# Patient Record
Sex: MALE | Race: WHITE | NOT HISPANIC OR LATINO | Employment: FULL TIME | URBAN - METROPOLITAN AREA
[De-identification: names, ages, dates, MRNs, and addresses within clinical notes are randomized per-mention and may not be internally consistent; named-entity substitution may affect disease eponyms.]

---

## 2019-06-19 ENCOUNTER — OFFICE VISIT CONVERTED (OUTPATIENT)
Dept: UROLOGY | Facility: CLINIC | Age: 49
End: 2019-06-19
Attending: UROLOGY

## 2019-06-20 ENCOUNTER — HOSPITAL ENCOUNTER (OUTPATIENT)
Dept: PERIOP | Facility: HOSPITAL | Age: 49
Setting detail: HOSPITAL OUTPATIENT SURGERY
Discharge: HOME | End: 2019-06-20
Attending: UROLOGY

## 2019-07-12 LAB
COLOR STONE: NORMAL
COMPN STONE: NORMAL
CONV CA OXALATE DIHYDRATE: 25 %
CONV CA OXALATE MONOHYDRATE: 50 %
CONV CALCIUM PHOSPHATE: 25 %
CONV CALCULI COMMENT: NORMAL
CONV CALCULI DISCLAIMER: NORMAL
CONV CALCULI NOTE: NORMAL
NIDUS STONE QL: NORMAL
SIZE STONE: NORMAL MM
WT STONE: 4.9 MG

## 2019-11-16 ENCOUNTER — HOSPITAL ENCOUNTER (OUTPATIENT)
Dept: URGENT CARE | Facility: CLINIC | Age: 49
Discharge: HOME | End: 2019-11-16
Attending: NURSE PRACTITIONER

## 2019-12-19 ENCOUNTER — OFFICE VISIT CONVERTED (OUTPATIENT)
Dept: FAMILY MEDICINE CLINIC | Facility: CLINIC | Age: 49
End: 2019-12-19
Attending: FAMILY MEDICINE

## 2019-12-19 ENCOUNTER — HOSPITAL ENCOUNTER (OUTPATIENT)
Dept: FAMILY MEDICINE CLINIC | Facility: CLINIC | Age: 49
Discharge: HOME | End: 2019-12-19
Attending: FAMILY MEDICINE

## 2019-12-19 LAB
ALBUMIN SERPL-MCNC: 4.3 G/DL (ref 3.5–5)
ALBUMIN/GLOB SERPL: 1.3 {RATIO} (ref 1.4–2.6)
ALP SERPL-CCNC: 126 U/L (ref 53–128)
ALT SERPL-CCNC: 35 U/L (ref 10–40)
ANION GAP SERPL CALC-SCNC: 23 MMOL/L (ref 8–19)
AST SERPL-CCNC: 22 U/L (ref 15–50)
BILIRUB SERPL-MCNC: 0.19 MG/DL (ref 0.2–1.3)
BUN SERPL-MCNC: 11 MG/DL (ref 5–25)
BUN/CREAT SERPL: 10 {RATIO} (ref 6–20)
CALCIUM SERPL-MCNC: 10 MG/DL (ref 8.7–10.4)
CHLORIDE SERPL-SCNC: 99 MMOL/L (ref 99–111)
CHOLEST SERPL-MCNC: 226 MG/DL (ref 107–200)
CHOLEST/HDLC SERPL: 8.4 {RATIO} (ref 3–6)
CONV CO2: 25 MMOL/L (ref 22–32)
CONV TOTAL PROTEIN: 7.7 G/DL (ref 6.3–8.2)
CREAT UR-MCNC: 1.08 MG/DL (ref 0.7–1.2)
GFR SERPLBLD BASED ON 1.73 SQ M-ARVRAT: >60 ML/MIN/{1.73_M2}
GLOBULIN UR ELPH-MCNC: 3.4 G/DL (ref 2–3.5)
GLUCOSE SERPL-MCNC: 100 MG/DL (ref 70–99)
HDLC SERPL-MCNC: 27 MG/DL (ref 40–60)
LDLC SERPL CALC-MCNC: 126 MG/DL (ref 70–100)
OSMOLALITY SERPL CALC.SUM OF ELEC: 293 MOSM/KG (ref 273–304)
POTASSIUM SERPL-SCNC: 4.6 MMOL/L (ref 3.5–5.3)
PSA SERPL-MCNC: 0.67 NG/ML (ref 0–4)
SODIUM SERPL-SCNC: 142 MMOL/L (ref 135–147)
TRIGL SERPL-MCNC: 1070 MG/DL (ref 40–150)

## 2020-02-10 ENCOUNTER — OFFICE VISIT CONVERTED (OUTPATIENT)
Dept: FAMILY MEDICINE CLINIC | Facility: CLINIC | Age: 50
End: 2020-02-10
Attending: FAMILY MEDICINE

## 2020-02-19 ENCOUNTER — CONVERSION ENCOUNTER (OUTPATIENT)
Dept: GASTROENTEROLOGY | Facility: CLINIC | Age: 50
End: 2020-02-19
Attending: INTERNAL MEDICINE

## 2020-11-25 ENCOUNTER — HOSPITAL ENCOUNTER (OUTPATIENT)
Dept: URGENT CARE | Facility: CLINIC | Age: 50
Discharge: HOME | End: 2020-11-25

## 2020-11-28 LAB — SARS-COV-2 RNA SPEC QL NAA+PROBE: NOT DETECTED

## 2021-05-15 VITALS — RESPIRATION RATE: 16 BRPM | BODY MASS INDEX: 36.29 KG/M2 | HEIGHT: 69 IN | WEIGHT: 245 LBS

## 2021-05-15 VITALS
HEART RATE: 117 BPM | DIASTOLIC BLOOD PRESSURE: 94 MMHG | OXYGEN SATURATION: 96 % | HEIGHT: 70 IN | WEIGHT: 244.25 LBS | SYSTOLIC BLOOD PRESSURE: 145 MMHG | BODY MASS INDEX: 34.97 KG/M2

## 2021-05-15 VITALS
SYSTOLIC BLOOD PRESSURE: 134 MMHG | WEIGHT: 246.37 LBS | HEIGHT: 70 IN | DIASTOLIC BLOOD PRESSURE: 82 MMHG | BODY MASS INDEX: 35.27 KG/M2 | OXYGEN SATURATION: 98 % | HEART RATE: 108 BPM

## 2021-09-04 ENCOUNTER — ANESTHESIA EVENT (OUTPATIENT)
Dept: PERIOP | Facility: HOSPITAL | Age: 51
End: 2021-09-04

## 2021-09-04 ENCOUNTER — APPOINTMENT (OUTPATIENT)
Dept: CT IMAGING | Facility: HOSPITAL | Age: 51
End: 2021-09-04

## 2021-09-04 ENCOUNTER — ANESTHESIA (OUTPATIENT)
Dept: PERIOP | Facility: HOSPITAL | Age: 51
End: 2021-09-04

## 2021-09-04 ENCOUNTER — HOSPITAL ENCOUNTER (OUTPATIENT)
Facility: HOSPITAL | Age: 51
Discharge: HOME | End: 2021-09-04
Attending: EMERGENCY MEDICINE | Admitting: FAMILY MEDICINE

## 2021-09-04 ENCOUNTER — READMISSION MANAGEMENT (OUTPATIENT)
Dept: CALL CENTER | Facility: HOSPITAL | Age: 51
End: 2021-09-04

## 2021-09-04 ENCOUNTER — APPOINTMENT (OUTPATIENT)
Dept: GENERAL RADIOLOGY | Facility: HOSPITAL | Age: 51
End: 2021-09-04

## 2021-09-04 VITALS
OXYGEN SATURATION: 95 % | BODY MASS INDEX: 38.26 KG/M2 | DIASTOLIC BLOOD PRESSURE: 94 MMHG | SYSTOLIC BLOOD PRESSURE: 154 MMHG | TEMPERATURE: 98.6 F | HEART RATE: 105 BPM | HEIGHT: 68 IN | RESPIRATION RATE: 16 BRPM | WEIGHT: 252.43 LBS

## 2021-09-04 DIAGNOSIS — N20.0 BILATERAL KIDNEY STONES: Primary | ICD-10-CM

## 2021-09-04 DIAGNOSIS — N20.1 URETERAL CALCULUS: ICD-10-CM

## 2021-09-04 LAB
ALBUMIN SERPL-MCNC: 4.5 G/DL (ref 3.5–5.2)
ALBUMIN/GLOB SERPL: 1.6 G/DL
ALP SERPL-CCNC: 94 U/L (ref 39–117)
ALT SERPL W P-5'-P-CCNC: 29 U/L (ref 1–41)
ANION GAP SERPL CALCULATED.3IONS-SCNC: 11.9 MMOL/L (ref 5–15)
AST SERPL-CCNC: 18 U/L (ref 1–40)
BACTERIA UR QL AUTO: ABNORMAL /HPF
BASOPHILS # BLD AUTO: 0.05 10*3/MM3 (ref 0–0.2)
BASOPHILS NFR BLD AUTO: 0.6 % (ref 0–1.5)
BILIRUB SERPL-MCNC: 0.2 MG/DL (ref 0–1.2)
BILIRUB UR QL STRIP: NEGATIVE
BUN SERPL-MCNC: 11 MG/DL (ref 6–20)
BUN/CREAT SERPL: 9.6 (ref 7–25)
CALCIUM SPEC-SCNC: 9.4 MG/DL (ref 8.6–10.5)
CHLORIDE SERPL-SCNC: 102 MMOL/L (ref 98–107)
CLARITY UR: ABNORMAL
CO2 SERPL-SCNC: 25.1 MMOL/L (ref 22–29)
COLOR UR: YELLOW
CREAT SERPL-MCNC: 1.14 MG/DL (ref 0.76–1.27)
DEPRECATED RDW RBC AUTO: 39.2 FL (ref 37–54)
EOSINOPHIL # BLD AUTO: 0.46 10*3/MM3 (ref 0–0.4)
EOSINOPHIL NFR BLD AUTO: 5.3 % (ref 0.3–6.2)
ERYTHROCYTE [DISTWIDTH] IN BLOOD BY AUTOMATED COUNT: 12.3 % (ref 12.3–15.4)
GFR SERPL CREATININE-BSD FRML MDRD: 68 ML/MIN/1.73
GLOBULIN UR ELPH-MCNC: 2.8 GM/DL
GLUCOSE SERPL-MCNC: 110 MG/DL (ref 65–99)
GLUCOSE UR STRIP-MCNC: NEGATIVE MG/DL
HCT VFR BLD AUTO: 45.7 % (ref 37.5–51)
HGB BLD-MCNC: 15 G/DL (ref 13–17.7)
HGB UR QL STRIP.AUTO: ABNORMAL
HOLD SPECIMEN: NORMAL
HOLD SPECIMEN: NORMAL
HYALINE CASTS UR QL AUTO: ABNORMAL /LPF
IMM GRANULOCYTES # BLD AUTO: 0.04 10*3/MM3 (ref 0–0.05)
IMM GRANULOCYTES NFR BLD AUTO: 0.5 % (ref 0–0.5)
KETONES UR QL STRIP: NEGATIVE
LEUKOCYTE ESTERASE UR QL STRIP.AUTO: ABNORMAL
LIPASE SERPL-CCNC: 44 U/L (ref 13–60)
LYMPHOCYTES # BLD AUTO: 4.41 10*3/MM3 (ref 0.7–3.1)
LYMPHOCYTES NFR BLD AUTO: 50.3 % (ref 19.6–45.3)
MCH RBC QN AUTO: 28.4 PG (ref 26.6–33)
MCHC RBC AUTO-ENTMCNC: 32.8 G/DL (ref 31.5–35.7)
MCV RBC AUTO: 86.4 FL (ref 79–97)
MONOCYTES # BLD AUTO: 0.55 10*3/MM3 (ref 0.1–0.9)
MONOCYTES NFR BLD AUTO: 6.3 % (ref 5–12)
NEUTROPHILS NFR BLD AUTO: 3.25 10*3/MM3 (ref 1.7–7)
NEUTROPHILS NFR BLD AUTO: 37 % (ref 42.7–76)
NITRITE UR QL STRIP: NEGATIVE
NRBC BLD AUTO-RTO: 0 /100 WBC (ref 0–0.2)
PH UR STRIP.AUTO: 6 [PH] (ref 5–8)
PLATELET # BLD AUTO: 257 10*3/MM3 (ref 140–450)
PMV BLD AUTO: 9.9 FL (ref 6–12)
POTASSIUM SERPL-SCNC: 3.9 MMOL/L (ref 3.5–5.2)
PROT SERPL-MCNC: 7.3 G/DL (ref 6–8.5)
PROT UR QL STRIP: NEGATIVE
RBC # BLD AUTO: 5.29 10*6/MM3 (ref 4.14–5.8)
RBC # UR: ABNORMAL /HPF
REF LAB TEST METHOD: ABNORMAL
SARS-COV-2 RNA RESP QL NAA+PROBE: NOT DETECTED
SODIUM SERPL-SCNC: 139 MMOL/L (ref 136–145)
SP GR UR STRIP: 1.01 (ref 1–1.03)
SQUAMOUS #/AREA URNS HPF: ABNORMAL /HPF
UROBILINOGEN UR QL STRIP: ABNORMAL
WBC # BLD AUTO: 8.76 10*3/MM3 (ref 3.4–10.8)
WBC UR QL AUTO: ABNORMAL /HPF
WHOLE BLOOD HOLD SPECIMEN: NORMAL
WHOLE BLOOD HOLD SPECIMEN: NORMAL

## 2021-09-04 PROCEDURE — 25010000002 HYDROMORPHONE 1 MG/ML SOLUTION: Performed by: FAMILY MEDICINE

## 2021-09-04 PROCEDURE — C1769 GUIDE WIRE: HCPCS | Performed by: UROLOGY

## 2021-09-04 PROCEDURE — 99203 OFFICE O/P NEW LOW 30 MIN: CPT | Performed by: UROLOGY

## 2021-09-04 PROCEDURE — 25010000002 HYDROMORPHONE 1 MG/ML SOLUTION: Performed by: EMERGENCY MEDICINE

## 2021-09-04 PROCEDURE — C1758 CATHETER, URETERAL: HCPCS | Performed by: UROLOGY

## 2021-09-04 PROCEDURE — U0003 INFECTIOUS AGENT DETECTION BY NUCLEIC ACID (DNA OR RNA); SEVERE ACUTE RESPIRATORY SYNDROME CORONAVIRUS 2 (SARS-COV-2) (CORONAVIRUS DISEASE [COVID-19]), AMPLIFIED PROBE TECHNIQUE, MAKING USE OF HIGH THROUGHPUT TECHNOLOGIES AS DESCRIBED BY CMS-2020-01-R: HCPCS | Performed by: UROLOGY

## 2021-09-04 PROCEDURE — 52352 CYSTOURETERO W/STONE REMOVE: CPT | Performed by: UROLOGY

## 2021-09-04 PROCEDURE — 96376 TX/PRO/DX INJ SAME DRUG ADON: CPT

## 2021-09-04 PROCEDURE — G0378 HOSPITAL OBSERVATION PER HR: HCPCS

## 2021-09-04 PROCEDURE — 25010000002 ONDANSETRON PER 1 MG: Performed by: NURSE PRACTITIONER

## 2021-09-04 PROCEDURE — 25010000002 ONDANSETRON PER 1 MG: Performed by: FAMILY MEDICINE

## 2021-09-04 PROCEDURE — 52353 CYSTOURETERO W/LITHOTRIPSY: CPT | Performed by: UROLOGY

## 2021-09-04 PROCEDURE — 99235 HOSP IP/OBS SAME DATE MOD 70: CPT | Performed by: FAMILY MEDICINE

## 2021-09-04 PROCEDURE — 52332 CYSTOSCOPY AND TREATMENT: CPT | Performed by: UROLOGY

## 2021-09-04 PROCEDURE — 87086 URINE CULTURE/COLONY COUNT: CPT | Performed by: EMERGENCY MEDICINE

## 2021-09-04 PROCEDURE — 25010000002 MIDAZOLAM PER 1MG: Performed by: NURSE ANESTHETIST, CERTIFIED REGISTERED

## 2021-09-04 PROCEDURE — 83690 ASSAY OF LIPASE: CPT | Performed by: EMERGENCY MEDICINE

## 2021-09-04 PROCEDURE — C2617 STENT, NON-COR, TEM W/O DEL: HCPCS | Performed by: UROLOGY

## 2021-09-04 PROCEDURE — 25010000002 PROPOFOL 10 MG/ML EMULSION: Performed by: NURSE ANESTHETIST, CERTIFIED REGISTERED

## 2021-09-04 PROCEDURE — C1894 INTRO/SHEATH, NON-LASER: HCPCS | Performed by: UROLOGY

## 2021-09-04 PROCEDURE — 85025 COMPLETE CBC W/AUTO DIFF WBC: CPT | Performed by: EMERGENCY MEDICINE

## 2021-09-04 PROCEDURE — 96374 THER/PROPH/DIAG INJ IV PUSH: CPT

## 2021-09-04 PROCEDURE — 74420 UROGRAPHY RTRGR +-KUB: CPT

## 2021-09-04 PROCEDURE — 25010000002 DEXAMETHASONE PER 1 MG: Performed by: NURSE ANESTHETIST, CERTIFIED REGISTERED

## 2021-09-04 PROCEDURE — 80053 COMPREHEN METABOLIC PANEL: CPT | Performed by: EMERGENCY MEDICINE

## 2021-09-04 PROCEDURE — 25010000002 FENTANYL CITRATE (PF) 50 MCG/ML SOLUTION: Performed by: NURSE ANESTHETIST, CERTIFIED REGISTERED

## 2021-09-04 PROCEDURE — 96375 TX/PRO/DX INJ NEW DRUG ADDON: CPT

## 2021-09-04 PROCEDURE — 81001 URINALYSIS AUTO W/SCOPE: CPT | Performed by: EMERGENCY MEDICINE

## 2021-09-04 PROCEDURE — 0 IOPAMIDOL PER 1 ML: Performed by: UROLOGY

## 2021-09-04 PROCEDURE — 74176 CT ABD & PELVIS W/O CONTRAST: CPT

## 2021-09-04 PROCEDURE — 99284 EMERGENCY DEPT VISIT MOD MDM: CPT

## 2021-09-04 PROCEDURE — 25010000002 KETOROLAC TROMETHAMINE PER 15 MG: Performed by: NURSE PRACTITIONER

## 2021-09-04 PROCEDURE — 25010000002 ONDANSETRON PER 1 MG: Performed by: NURSE ANESTHETIST, CERTIFIED REGISTERED

## 2021-09-04 PROCEDURE — C9803 HOPD COVID-19 SPEC COLLECT: HCPCS | Performed by: UROLOGY

## 2021-09-04 DEVICE — STNT URETRL CLASSC DBL PIG 6F 24CM: Type: IMPLANTABLE DEVICE | Site: URETER | Status: FUNCTIONAL

## 2021-09-04 RX ORDER — DEXAMETHASONE SODIUM PHOSPHATE 4 MG/ML
INJECTION, SOLUTION INTRA-ARTICULAR; INTRALESIONAL; INTRAMUSCULAR; INTRAVENOUS; SOFT TISSUE AS NEEDED
Status: DISCONTINUED | OUTPATIENT
Start: 2021-09-04 | End: 2021-09-04 | Stop reason: SURG

## 2021-09-04 RX ORDER — SODIUM CHLORIDE 0.9 % (FLUSH) 0.9 %
10 SYRINGE (ML) INJECTION EVERY 12 HOURS SCHEDULED
Status: DISCONTINUED | OUTPATIENT
Start: 2021-09-04 | End: 2021-09-04 | Stop reason: HOSPADM

## 2021-09-04 RX ORDER — MIDAZOLAM HYDROCHLORIDE 2 MG/2ML
2 INJECTION, SOLUTION INTRAMUSCULAR; INTRAVENOUS ONCE
Status: DISCONTINUED | OUTPATIENT
Start: 2021-09-04 | End: 2021-09-04 | Stop reason: HOSPADM

## 2021-09-04 RX ORDER — ONDANSETRON 2 MG/ML
4 INJECTION INTRAMUSCULAR; INTRAVENOUS EVERY 6 HOURS PRN
Status: DISCONTINUED | OUTPATIENT
Start: 2021-09-04 | End: 2021-09-04 | Stop reason: HOSPADM

## 2021-09-04 RX ORDER — SODIUM CHLORIDE 0.9 % (FLUSH) 0.9 %
10 SYRINGE (ML) INJECTION AS NEEDED
Status: DISCONTINUED | OUTPATIENT
Start: 2021-09-04 | End: 2021-09-04 | Stop reason: HOSPADM

## 2021-09-04 RX ORDER — ONDANSETRON 4 MG/1
4 TABLET, FILM COATED ORAL EVERY 6 HOURS PRN
Status: DISCONTINUED | OUTPATIENT
Start: 2021-09-04 | End: 2021-09-04 | Stop reason: HOSPADM

## 2021-09-04 RX ORDER — LEVOFLOXACIN 500 MG/1
500 TABLET, FILM COATED ORAL ONCE
Status: COMPLETED | OUTPATIENT
Start: 2021-09-04 | End: 2021-09-04

## 2021-09-04 RX ORDER — SODIUM CHLORIDE, SODIUM LACTATE, POTASSIUM CHLORIDE, CALCIUM CHLORIDE 600; 310; 30; 20 MG/100ML; MG/100ML; MG/100ML; MG/100ML
9 INJECTION, SOLUTION INTRAVENOUS CONTINUOUS PRN
Status: DISCONTINUED | OUTPATIENT
Start: 2021-09-04 | End: 2021-09-04 | Stop reason: HOSPADM

## 2021-09-04 RX ORDER — SUCCINYLCHOLINE/SOD CL,ISO/PF 100 MG/5ML
SYRINGE (ML) INTRAVENOUS AS NEEDED
Status: DISCONTINUED | OUTPATIENT
Start: 2021-09-04 | End: 2021-09-04 | Stop reason: SURG

## 2021-09-04 RX ORDER — KETOROLAC TROMETHAMINE 30 MG/ML
30 INJECTION, SOLUTION INTRAMUSCULAR; INTRAVENOUS ONCE
Status: COMPLETED | OUTPATIENT
Start: 2021-09-04 | End: 2021-09-04

## 2021-09-04 RX ORDER — PROMETHAZINE HYDROCHLORIDE 12.5 MG/1
25 TABLET ORAL ONCE AS NEEDED
Status: DISCONTINUED | OUTPATIENT
Start: 2021-09-04 | End: 2021-09-04 | Stop reason: HOSPADM

## 2021-09-04 RX ORDER — OXYCODONE HYDROCHLORIDE 5 MG/1
5 TABLET ORAL
Status: DISCONTINUED | OUTPATIENT
Start: 2021-09-04 | End: 2021-09-04 | Stop reason: HOSPADM

## 2021-09-04 RX ORDER — SODIUM CHLORIDE, SODIUM LACTATE, POTASSIUM CHLORIDE, CALCIUM CHLORIDE 600; 310; 30; 20 MG/100ML; MG/100ML; MG/100ML; MG/100ML
INJECTION, SOLUTION INTRAVENOUS CONTINUOUS PRN
Status: DISCONTINUED | OUTPATIENT
Start: 2021-09-04 | End: 2021-09-04 | Stop reason: SURG

## 2021-09-04 RX ORDER — MAGNESIUM HYDROXIDE 1200 MG/15ML
LIQUID ORAL AS NEEDED
Status: DISCONTINUED | OUTPATIENT
Start: 2021-09-04 | End: 2021-09-04 | Stop reason: HOSPADM

## 2021-09-04 RX ORDER — LIDOCAINE HYDROCHLORIDE 20 MG/ML
INJECTION, SOLUTION INFILTRATION; PERINEURAL AS NEEDED
Status: DISCONTINUED | OUTPATIENT
Start: 2021-09-04 | End: 2021-09-04 | Stop reason: SURG

## 2021-09-04 RX ORDER — MEPERIDINE HYDROCHLORIDE 25 MG/ML
12.5 INJECTION INTRAMUSCULAR; INTRAVENOUS; SUBCUTANEOUS
Status: DISCONTINUED | OUTPATIENT
Start: 2021-09-04 | End: 2021-09-04 | Stop reason: HOSPADM

## 2021-09-04 RX ORDER — ONDANSETRON 2 MG/ML
4 INJECTION INTRAMUSCULAR; INTRAVENOUS ONCE
Status: COMPLETED | OUTPATIENT
Start: 2021-09-04 | End: 2021-09-04

## 2021-09-04 RX ORDER — ONDANSETRON 2 MG/ML
4 INJECTION INTRAMUSCULAR; INTRAVENOUS ONCE AS NEEDED
Status: DISCONTINUED | OUTPATIENT
Start: 2021-09-04 | End: 2021-09-04 | Stop reason: HOSPADM

## 2021-09-04 RX ORDER — MIDAZOLAM HYDROCHLORIDE 2 MG/2ML
INJECTION, SOLUTION INTRAMUSCULAR; INTRAVENOUS AS NEEDED
Status: DISCONTINUED | OUTPATIENT
Start: 2021-09-04 | End: 2021-09-04 | Stop reason: SURG

## 2021-09-04 RX ORDER — FENTANYL CITRATE 50 UG/ML
INJECTION, SOLUTION INTRAMUSCULAR; INTRAVENOUS AS NEEDED
Status: DISCONTINUED | OUTPATIENT
Start: 2021-09-04 | End: 2021-09-04 | Stop reason: SURG

## 2021-09-04 RX ORDER — ONDANSETRON 2 MG/ML
INJECTION INTRAMUSCULAR; INTRAVENOUS AS NEEDED
Status: DISCONTINUED | OUTPATIENT
Start: 2021-09-04 | End: 2021-09-04 | Stop reason: SURG

## 2021-09-04 RX ORDER — ROCURONIUM BROMIDE 10 MG/ML
INJECTION, SOLUTION INTRAVENOUS AS NEEDED
Status: DISCONTINUED | OUTPATIENT
Start: 2021-09-04 | End: 2021-09-04 | Stop reason: SURG

## 2021-09-04 RX ORDER — DEXTROSE AND SODIUM CHLORIDE 5; .45 G/100ML; G/100ML
75 INJECTION, SOLUTION INTRAVENOUS CONTINUOUS
Status: DISCONTINUED | OUTPATIENT
Start: 2021-09-04 | End: 2021-09-04 | Stop reason: HOSPADM

## 2021-09-04 RX ORDER — PROPOFOL 10 MG/ML
VIAL (ML) INTRAVENOUS AS NEEDED
Status: DISCONTINUED | OUTPATIENT
Start: 2021-09-04 | End: 2021-09-04 | Stop reason: SURG

## 2021-09-04 RX ORDER — KETOROLAC TROMETHAMINE 15 MG/ML
15 INJECTION, SOLUTION INTRAMUSCULAR; INTRAVENOUS EVERY 6 HOURS PRN
Status: DISCONTINUED | OUTPATIENT
Start: 2021-09-04 | End: 2021-09-04 | Stop reason: HOSPADM

## 2021-09-04 RX ORDER — NAPROXEN SODIUM 220 MG
220 TABLET ORAL 2 TIMES DAILY WITH MEALS
Qty: 28 TABLET | Refills: 0 | Status: SHIPPED | OUTPATIENT
Start: 2021-09-04 | End: 2021-09-18

## 2021-09-04 RX ORDER — PROMETHAZINE HYDROCHLORIDE 25 MG/1
25 SUPPOSITORY RECTAL ONCE AS NEEDED
Status: DISCONTINUED | OUTPATIENT
Start: 2021-09-04 | End: 2021-09-04 | Stop reason: HOSPADM

## 2021-09-04 RX ORDER — EPHEDRINE SULFATE 50 MG/ML
INJECTION, SOLUTION INTRAVENOUS AS NEEDED
Status: DISCONTINUED | OUTPATIENT
Start: 2021-09-04 | End: 2021-09-04 | Stop reason: SURG

## 2021-09-04 RX ADMIN — DEXAMETHASONE SODIUM PHOSPHATE 4 MG: 4 INJECTION INTRA-ARTICULAR; INTRALESIONAL; INTRAMUSCULAR; INTRAVENOUS; SOFT TISSUE at 12:37

## 2021-09-04 RX ADMIN — EPHEDRINE SULFATE 15 MG: 50 INJECTION INTRAVENOUS at 13:02

## 2021-09-04 RX ADMIN — KETOROLAC TROMETHAMINE 30 MG: 30 INJECTION, SOLUTION INTRAMUSCULAR at 05:33

## 2021-09-04 RX ADMIN — Medication 160 MG: at 12:29

## 2021-09-04 RX ADMIN — HYDROMORPHONE HYDROCHLORIDE 0.5 MG: 1 INJECTION, SOLUTION INTRAMUSCULAR; INTRAVENOUS; SUBCUTANEOUS at 08:43

## 2021-09-04 RX ADMIN — DEXTROSE AND SODIUM CHLORIDE 75 ML/HR: 5; 450 INJECTION, SOLUTION INTRAVENOUS at 09:49

## 2021-09-04 RX ADMIN — ROCURONIUM BROMIDE 30 MG: 10 INJECTION INTRAVENOUS at 13:40

## 2021-09-04 RX ADMIN — SODIUM CHLORIDE, PRESERVATIVE FREE 10 ML: 5 INJECTION INTRAVENOUS at 11:30

## 2021-09-04 RX ADMIN — SODIUM CHLORIDE 500 ML: 9 INJECTION, SOLUTION INTRAVENOUS at 05:36

## 2021-09-04 RX ADMIN — ONDANSETRON 4 MG: 2 INJECTION INTRAMUSCULAR; INTRAVENOUS at 08:46

## 2021-09-04 RX ADMIN — HYDROMORPHONE HYDROCHLORIDE 1 MG: 1 INJECTION, SOLUTION INTRAMUSCULAR; INTRAVENOUS; SUBCUTANEOUS at 06:30

## 2021-09-04 RX ADMIN — ONDANSETRON 4 MG: 2 INJECTION INTRAMUSCULAR; INTRAVENOUS at 12:38

## 2021-09-04 RX ADMIN — LIDOCAINE HYDROCHLORIDE 50 MG: 20 INJECTION, SOLUTION INFILTRATION; PERINEURAL at 12:29

## 2021-09-04 RX ADMIN — FENTANYL CITRATE 100 MCG: 50 INJECTION INTRAMUSCULAR; INTRAVENOUS at 12:29

## 2021-09-04 RX ADMIN — ONDANSETRON 4 MG: 2 INJECTION INTRAMUSCULAR; INTRAVENOUS at 05:32

## 2021-09-04 RX ADMIN — ROCURONIUM BROMIDE 5 MG: 10 INJECTION INTRAVENOUS at 12:29

## 2021-09-04 RX ADMIN — PROPOFOL 200 MG: 10 INJECTION, EMULSION INTRAVENOUS at 12:29

## 2021-09-04 RX ADMIN — LEVOFLOXACIN 500 MG: 500 TABLET, FILM COATED ORAL at 11:29

## 2021-09-04 RX ADMIN — SUGAMMADEX 200 MG: 100 INJECTION, SOLUTION INTRAVENOUS at 13:44

## 2021-09-04 RX ADMIN — MIDAZOLAM HYDROCHLORIDE 2 MG: 1 INJECTION, SOLUTION INTRAMUSCULAR; INTRAVENOUS at 12:27

## 2021-09-04 RX ADMIN — SODIUM CHLORIDE, POTASSIUM CHLORIDE, SODIUM LACTATE AND CALCIUM CHLORIDE: 600; 310; 30; 20 INJECTION, SOLUTION INTRAVENOUS at 12:26

## 2021-09-04 NOTE — ANESTHESIA POSTPROCEDURE EVALUATION
Patient: Paul Almendarez    Procedure Summary     Date: 09/04/21 Room / Location: [facility] OR 07 / [facility] MAIN OR    Anesthesia Start: 1226 Anesthesia Stop: 1356    Procedure: CYSTOSCOPY, BILATERAL URETEROSCOPY  AND  LEFT LASER LITHOTRIPSY WITH STONE REMOVAL ,  RIGHT RETROGRADE AND RIGHT URETERAL  STENT INSERTION (Bilateral ) Diagnosis:       Ureteral calculus      (Ureteral calculus [N20.1])    Surgeons: Andre Majano MD Provider: Jaylan Pruett MD    Anesthesia Type: general ASA Status: 2          Anesthesia Type: general    Vitals  Vitals Value Taken Time   /94 09/04/21 1423   Temp 36.7 °C (98 °F) 09/04/21 1415   Pulse 95 09/04/21 1427   Resp 18 09/04/21 1355   SpO2 97 % 09/04/21 1426   Vitals shown include unvalidated device data.        Post Anesthesia Care and Evaluation    Patient location during evaluation: PACU  Patient participation: complete - patient participated  Level of consciousness: awake and alert  Pain score: 0  Pain management: adequate  Airway patency: patent  Anesthetic complications: No anesthetic complications  PONV Status: none  Cardiovascular status: acceptable  Respiratory status: acceptable  Hydration status: acceptable  No anesthesia care post op

## 2021-09-04 NOTE — ANESTHESIA PREPROCEDURE EVALUATION
Anesthesia Evaluation     Patient summary reviewed and Nursing notes reviewed   no history of anesthetic complications:  NPO Solid Status: > 8 hours  NPO Liquid Status: > 8 hours           Airway   Mallampati: IV  TM distance: >3 FB  Neck ROM: full  Large neck circumference and No difficulty expected  Dental - normal exam     Pulmonary - normal exam   (+) a smoker Former,   Cardiovascular - negative cardio ROS and normal exam  Exercise tolerance: good (4-7 METS)    Rhythm: regular  Rate: normal        Neuro/Psych- negative ROS  GI/Hepatic/Renal/Endo    (+) obesity,   renal disease stones,     ROS Comment: Nausea and vomiting with the kidney stone pain this morning. Plan RSI.    Musculoskeletal (-) negative ROS    Abdominal   (+) obese,     Bowel sounds: normal.   Substance History - negative use     OB/GYN negative ob/gyn ROS         Other - negative ROS                     Anesthesia Plan    ASA 2     general   Rapid sequence  intravenous induction     Anesthetic plan, all risks, benefits, and alternatives have been provided, discussed and informed consent has been obtained with: patient.    Plan discussed with CRNA.

## 2021-09-04 NOTE — OUTREACH NOTE
Prep Survey      Responses   [facility] facility patient discharged from?  [facility]   Is LACE score < 7 ?  Yes   Emergency Room discharge w/ pulse ox?  No   Eligibility  [facility]   Date of Admission  09/04/21   Date of Discharge  09/04/21   Discharge Disposition  Home or Self Care   Discharge diagnosis  Ureteral calculus CYSTOSCOPY, BILATERAL URETEROSCOPY  AND  LEFT LASER LITHOTRIPSY WITH STONE REMOVAL ,  RIGHT RETROGRADE AND RIGHT URETERAL  STENT INSERTION   Does the patient have one of the following disease processes/diagnoses(primary or secondary)?  General Surgery   Does the patient have Home health ordered?  No   Is there a DME ordered?  No   Prep survey completed?  Yes          Betty Roca RN

## 2021-09-05 LAB — BACTERIA SPEC AEROBE CULT: NO GROWTH

## 2021-09-06 ENCOUNTER — TRANSITIONAL CARE MANAGEMENT TELEPHONE ENCOUNTER (OUTPATIENT)
Dept: CALL CENTER | Facility: HOSPITAL | Age: 51
End: 2021-09-06

## 2021-09-06 NOTE — OUTREACH NOTE
Call Center TCM Note      Responses   [facility] patient discharged from?  [facility]   Does the patient have one of the following disease processes/diagnoses(primary or secondary)?  General Surgery   TCM attempt successful?  No [NO verbal release]   Unsuccessful attempts  Attempt 2          Catherine Houston RN    9/6/2021, 16:06 EDT

## 2021-09-07 ENCOUNTER — TRANSITIONAL CARE MANAGEMENT TELEPHONE ENCOUNTER (OUTPATIENT)
Dept: CALL CENTER | Facility: HOSPITAL | Age: 51
End: 2021-09-07

## 2021-09-07 NOTE — OUTREACH NOTE
Call Center TCM Note      Responses   [facility] patient discharged from?  [facility]   Does the patient have one of the following disease processes/diagnoses(primary or secondary)?  General Surgery   TCM attempt successful?  Yes   Call start time  0831   Call end time  [phone]   Discharge diagnosis  Ureteral calculus CYSTOSCOPY, BILATERAL URETEROSCOPY  AND  LEFT LASER LITHOTRIPSY WITH STONE REMOVAL ,  RIGHT RETROGRADE AND RIGHT URETERAL  STENT INSERTION   Meds reviewed with patient/caregiver?  Yes   Is the patient having any side effects they believe may be caused by any medication additions or changes?  No   Does the patient have all medications related to this admission filled (includes all antibiotics, pain medications, etc.)  Yes   Is the patient taking all medications as directed (includes completed medication regime)?  Yes   Medication comments  Aleve prescribed   Does the patient have a follow up appointment scheduled with their surgeon?  No   What is preventing the patient from scheduling follow up appointments?  Waiting on return call [Patient attempting to schedule an appt--will route a message to office and request a call to pt]   Has the patient kept scheduled appointments due by today?  N/A   Comments  Hospital D/C follow-up scheduled for 9/9/21 @1215pm    Has home health visited the patient within 72 hours of discharge?  N/A   Psychosocial issues?  No   Did the patient receive a copy of their discharge instructions?  Yes   Nursing interventions  Reviewed instructions with patient   What is the patient's perception of their health status since discharge?  Same [Pt has a ureteral stent and is urinating w/o feeling of excess pressure, urine is bright pink (drinking plenty of fluids).  Denies fever/chills, n/v. Uncomfortable because of stent and eager for removal of stent/stone. ]   Nursing interventions  Nurse provided patient education   Is the patient /caregiver able to teach back basic post-op care?   No tub bath, swimming, or hot tub until instructed by MD [Patient has returned to work--ensured he is drinking plenty of fluids]   Is the patient/caregiver able to teach back signs and symptoms of incisional infection?  -- [Pt does not have an incision]   Is the patient/caregiver able to teach back the hierarchy of who to call/visit for symptoms/problems? PCP, Specialist, Home health nurse, Urgent Care, ED, 911  Yes   TCM call completed?  Yes          Catherine Houston RN    9/7/2021, 08:46 EDT

## 2021-09-08 NOTE — PROGRESS NOTES
Chief Complaint    Urologic complaint    Subjective          Paul Almendarez presents to [facility] UROLOGY  History of Present Illness    50-year-old gentleman presented on 9/4 to the emergency room to local his urologist with bilateral structuring stone status post surgery    Patient's been doing pretty well since surgery, no fevers or chills.  Pain is about a 1 currently.    Patient has never been on medicine for kidney stone prevention.    9/9/2021 urine culture-negative  9/4/2021 bilateral ureteroscopy, left stone removal, right stent placement - Gretel -no stent placed on the left, no mention of removal of nonobstructing left renal stones in the operative note.  Unable to identify the right stone because of a tortuous ureter, right stent was placed    9/4/2021 CT-abd/pelvis without -left kidney with a 5 mm mid ureteral stone, 3 nonobstructing stones in the left kidney 5 mm, 4 mm and 2 mm.  Right kidney has a 2 mm stone in the kidney nonobstructing and a 1.4 x 1.2 proximal ureteral stone    No cardiopulmonary history.  Takes naproxen    9/21 creatinine 1.1, GFR 68    Previous     6[place]14[place] ureteral stones one is about 4 mm and one is about 7 mm.  Also a 3 mm nonobstructing left renal stone.  7 mm right nonobstructing renal stone.    he has passed through for kidney stones, he said lithotripsy x2    No cardiopulmonary history.  Patient does not smoke.  Patient does not use blood thinner.    Results for orders placed or performed in visit on 09/10/21   POC Urinalysis Dipstick, Automated    Specimen: Urine   Result Value Ref Range    Color Red (A) Yellow, Straw, Dark Yellow, Yadira    Clarity, UA Clear Clear    Specific Gravity  1.025 1.005 - 1.030    pH, Urine 6.5 5.0 - 8.0    Leukocytes Small (1+) (A) Negative    Nitrite, UA Negative Negative    Protein,  mg/dL (A) Negative mg/dL    Glucose, UA Negative Negative, 1000 mg/dL  (3+) mg/dL    Ketones, UA Negative Negative    Urobilinogen, UA Normal Normal    Bilirubin Negative Negative    Blood, UA Large (A) Negative         Past History:  Medical History: has a past medical history of Kidney stone.   Surgical History: has a past surgical history that includes Kidney stone surgery and ureteroscopy laser lithotripsy with stent insertion (Bilateral, 9/4/2021).   Family History: family history is not on file.   Social History: reports that he has never smoked. He does not have any smokeless tobacco history on file. He reports previous alcohol use. He reports that he does not use drugs.  Allergies: Iodine       Current Outpatient Medications:   •  naproxen sodium (Aleve) 220 MG tablet, Take 1 tablet by mouth 2 (Two) Times a Day With Meals for 14 days., Disp: 28 tablet, Rfl: 0     Physical exam       Alert and orient x3  Well appearing, well developed, in no acute distress   Unlabored respirations  Nontender/nondistended  ctab  rrr  Grossly oriented to person, place and time, judgment is intact, normal mood and affect         Objective     Vital Signs:   There were no vitals taken for this visit.             Assessment and Plan    Diagnoses and all orders for this visit:    1. Nephrolithiasis (Primary)      Patient needs to be scheduled for cystoscopy with right ureteroscopy with laser and right ureteral stent exchange.  Risks and benefits were discussed including bleeding, infection and damage to the urinary system.  We also discussed the risk of anesthesia up to and including death.  Patient voiced understanding and would like to proceed.

## 2021-09-09 ENCOUNTER — OFFICE VISIT (OUTPATIENT)
Dept: FAMILY MEDICINE CLINIC | Facility: CLINIC | Age: 51
End: 2021-09-09

## 2021-09-09 VITALS
HEART RATE: 95 BPM | BODY MASS INDEX: 38.28 KG/M2 | OXYGEN SATURATION: 98 % | DIASTOLIC BLOOD PRESSURE: 96 MMHG | SYSTOLIC BLOOD PRESSURE: 166 MMHG | HEIGHT: 68 IN | TEMPERATURE: 97.8 F | WEIGHT: 252.6 LBS

## 2021-09-09 DIAGNOSIS — Z09 HOSPITAL DISCHARGE FOLLOW-UP: ICD-10-CM

## 2021-09-09 DIAGNOSIS — I10 ESSENTIAL HYPERTENSION: ICD-10-CM

## 2021-09-09 DIAGNOSIS — N20.0 KIDNEY STONE: Primary | ICD-10-CM

## 2021-09-09 PROCEDURE — 99496 TRANSJ CARE MGMT HIGH F2F 7D: CPT | Performed by: FAMILY MEDICINE

## 2021-09-09 RX ORDER — IRBESARTAN 150 MG/1
150 TABLET ORAL NIGHTLY
Qty: 90 TABLET | Refills: 0 | Status: SHIPPED | OUTPATIENT
Start: 2021-09-09 | End: 2021-11-05

## 2021-09-09 NOTE — ASSESSMENT & PLAN NOTE
He checks his blood pressure infrequently outside the office.  He states that when he does it it will typically be mildly elevated.  We will go ahead and start medication today and then have him follow-up.

## 2021-09-09 NOTE — ASSESSMENT & PLAN NOTE
Overall he is doing well as expected.  He still has a ureter stent in place that is still causes him some mild symptoms.  He has follow-up with urology, Dr. Azul.

## 2021-09-09 NOTE — ASSESSMENT & PLAN NOTE
He still has a right renal stone.  He has a follow-up with Dr. Azul tomorrow to talk about further management of this.

## 2021-09-09 NOTE — PROGRESS NOTES
Chief Complaint   Patient presents with   • Hospital Follow Up Visit     kidney stone        Subjective     Paul Almendarez  has a past medical history of Kidney stone.    Hospital admission-he was actually admitted 9/4 and discharged 9/4/2021.  He was actually admitted very early in the morning, then had a lithotripsy procedure and then was discharged from there within the same day.  The discharge nurse had called him afterwards see how he was doing and if he needed any further needs care assistance.  He states he still has some dysuria and some hematuria, but still has a right ureter stent.  He still has a stone in the right kidney which urology is planning a future lithotripsy for.      PHQ-2 Depression Screening  Little interest or pleasure in doing things? 0   Feeling down, depressed, or hopeless? 0   PHQ-2 Total Score 0   PHQ-9 Depression Screening  Little interest or pleasure in doing things? 0   Feeling down, depressed, or hopeless? 0   Trouble falling or staying asleep, or sleeping too much?     Feeling tired or having little energy?     Poor appetite or overeating?     Feeling bad about yourself - or that you are a failure or have let yourself or your family down?     Trouble concentrating on things, such as reading the newspaper or watching television?     Moving or speaking so slowly that other people could have noticed? Or the opposite - being so fidgety or restless that you have been moving around a lot more than usual?     Thoughts that you would be better off dead, or of hurting yourself in some way?     PHQ-9 Total Score 0   If you checked off any problems, how difficult have these problems made it for you to do your work, take care of things at home, or get along with other people?       Allergies   Allergen Reactions   • Iodine Hives, Shortness Of Breath and Swelling       Prior to Admission medications    Medication Sig Start Date End Date Taking? Authorizing Provider   naproxen sodium  "(Aleve) 220 MG tablet Take 1 tablet by mouth 2 (Two) Times a Day With Meals for 14 days. 21 Yes Judson Benítez MD        Patient Active Problem List   Diagnosis   • Ureteral stone   • Kidney stone   • Hospital discharge follow-up   • Essential hypertension        Past Surgical History:   Procedure Laterality Date   • KIDNEY STONE SURGERY     • URETEROSCOPY LASER LITHOTRIPSY WITH STENT INSERTION Bilateral 2021    Procedure: CYSTOSCOPY, BILATERAL URETEROSCOPY  AND  LEFT LASER LITHOTRIPSY WITH STONE REMOVAL ,  RIGHT RETROGRADE AND RIGHT URETERAL  STENT INSERTION;  Surgeon: Andre Majano MD;  Location: [facility] MAIN OR;  Service: Urology;  Laterality: Bilateral;       Social History     Socioeconomic History   • Marital status:      Spouse name: Not on file   • Number of children: Not on file   • Years of education: Not on file   • Highest education level: Not on file   Tobacco Use   • Smoking status: Former Smoker     Types: Cigarettes     Quit date:      Years since quittin.6   • Smokeless tobacco: Never Used   Vaping Use   • Vaping Use: Never used   Substance and Sexual Activity   • Alcohol use: Not Currently   • Drug use: Never       History reviewed. No pertinent family history.    Family history, surgical history, past medical history, Allergies and med's reviewed with patient today and updated in [employer] EMR.     ROS:  Review of Systems   Constitutional: Negative for chills, fatigue and fever.   Genitourinary: Positive for dysuria, frequency and hematuria. Negative for difficulty urinating and flank pain.   Musculoskeletal: Negative for back pain.       OBJECTIVE:  Vitals:    21 1147   BP: 166/96   BP Location: Right arm   Patient Position: Sitting   Pulse: 95   Temp: 97.8 °F (36.6 °C)   SpO2: 98%   Weight: 115 kg (252 lb 9.6 oz)   Height: 172.7 cm (68\")     No exam data present   Body mass index is 38.41 kg/m².  No LMP for male patient.    Physical Exam  Vitals and nursing " note reviewed.   Constitutional:       General: He is not in acute distress.     Appearance: Normal appearance. He is normal weight.   HENT:      Head: Normocephalic.      Right Ear: Tympanic membrane, ear canal and external ear normal.      Left Ear: Tympanic membrane, ear canal and external ear normal.      Nose: Nose normal.      Mouth/Throat:      Mouth: Mucous membranes are moist.      Pharynx: Oropharynx is clear.   Eyes:      General: No scleral icterus.     Conjunctiva/sclera: Conjunctivae normal.      Pupils: Pupils are equal, round, and reactive to light.   Cardiovascular:      Rate and Rhythm: Normal rate and regular rhythm.      Pulses: Normal pulses.      Heart sounds: Normal heart sounds. No murmur heard.     Pulmonary:      Effort: Pulmonary effort is normal.      Breath sounds: Normal breath sounds. No wheezing, rhonchi or rales.   Musculoskeletal:      Cervical back: No rigidity or tenderness.   Lymphadenopathy:      Cervical: No cervical adenopathy.   Skin:     General: Skin is warm and dry.      Coloration: Skin is not jaundiced.      Findings: No rash.   Neurological:      General: No focal deficit present.      Mental Status: He is alert and oriented to person, place, and time.      Gait: Gait normal.   Psychiatric:         Mood and Affect: Mood normal.         Thought Content: Thought content normal.         Judgment: Judgment normal.         Procedures    Admission on 09/04/2021, Discharged on 09/04/2021   Component Date Value Ref Range Status   • Glucose 09/04/2021 110* 65 - 99 mg/dL Final   • BUN 09/04/2021 11  6 - 20 mg/dL Final   • Creatinine 09/04/2021 1.14  0.76 - 1.27 mg/dL Final   • Sodium 09/04/2021 139  136 - 145 mmol/L Final   • Potassium 09/04/2021 3.9  3.5 - 5.2 mmol/L Final    Slight hemolysis detected by analyzer. Results may be affected.   • Chloride 09/04/2021 102  98 - 107 mmol/L Final   • CO2 09/04/2021 25.1  22.0 - 29.0 mmol/L Final   • Calcium 09/04/2021 9.4  8.6 - 10.5  mg/dL Final   • Total Protein 09/04/2021 7.3  6.0 - 8.5 g/dL Final   • Albumin 09/04/2021 4.50  3.50 - 5.20 g/dL Final   • ALT (SGPT) 09/04/2021 29  1 - 41 U/L Final   • AST (SGOT) 09/04/2021 18  1 - 40 U/L Final   • Alkaline Phosphatase 09/04/2021 94  39 - 117 U/L Final   • Total Bilirubin 09/04/2021 0.2  0.0 - 1.2 mg/dL Final   • eGFR Non  Amer 09/04/2021 68  >60 mL/min/1.73 Final   • Globulin 09/04/2021 2.8  gm/dL Final   • A/G Ratio 09/04/2021 1.6  g/dL Final   • BUN/Creatinine Ratio 09/04/2021 9.6  7.0 - 25.0 Final   • Anion Gap 09/04/2021 11.9  5.0 - 15.0 mmol/L Final   • Lipase 09/04/2021 44  13 - 60 U/L Final   • Color, UA 09/04/2021 Yellow  Yellow, Straw Final   • Appearance, UA 09/04/2021 Slightly Cloudy* Clear Final   • pH, UA 09/04/2021 6.0  5.0 - 8.0 Final   • Specific Gravity, UA 09/04/2021 1.015  1.005 - 1.030 Final   • Glucose, UA 09/04/2021 Negative  Negative Final   • Ketones, UA 09/04/2021 Negative  Negative Final   • Bilirubin, UA 09/04/2021 Negative  Negative Final   • Blood, UA 09/04/2021 Large (3+)* Negative Final   • Protein, UA 09/04/2021 Negative  Negative Final   • Leuk Esterase, UA 09/04/2021 Small (1+)* Negative Final   • Nitrite, UA 09/04/2021 Negative  Negative Final   • Urobilinogen, UA 09/04/2021 0.2 E.U./dL  0.2 - 1.0 E.U./dL Final   • Extra Tube 09/04/2021 Hold for add-ons.   Final    Auto resulted.   • Extra Tube 09/04/2021 hold for add-on   Final    Auto resulted   • Extra Tube 09/04/2021 Hold for add-ons.   Final    Auto resulted.   • Extra Tube 09/04/2021 hold for add-on   Final    Auto resulted   • WBC 09/04/2021 8.76  3.40 - 10.80 10*3/mm3 Final   • RBC 09/04/2021 5.29  4.14 - 5.80 10*6/mm3 Final   • Hemoglobin 09/04/2021 15.0  13.0 - 17.7 g/dL Final   • Hematocrit 09/04/2021 45.7  37.5 - 51.0 % Final   • MCV 09/04/2021 86.4  79.0 - 97.0 fL Final   • MCH 09/04/2021 28.4  26.6 - 33.0 pg Final   • MCHC 09/04/2021 32.8  31.5 - 35.7 g/dL Final   • RDW 09/04/2021 12.3  12.3  - 15.4 % Final   • RDW-SD 09/04/2021 39.2  37.0 - 54.0 fl Final   • MPV 09/04/2021 9.9  6.0 - 12.0 fL Final   • Platelets 09/04/2021 257  140 - 450 10*3/mm3 Final   • Neutrophil % 09/04/2021 37.0* 42.7 - 76.0 % Final   • Lymphocyte % 09/04/2021 50.3* 19.6 - 45.3 % Final   • Monocyte % 09/04/2021 6.3  5.0 - 12.0 % Final   • Eosinophil % 09/04/2021 5.3  0.3 - 6.2 % Final   • Basophil % 09/04/2021 0.6  0.0 - 1.5 % Final   • Immature Grans % 09/04/2021 0.5  0.0 - 0.5 % Final   • Neutrophils, Absolute 09/04/2021 3.25  1.70 - 7.00 10*3/mm3 Final   • Lymphocytes, Absolute 09/04/2021 4.41* 0.70 - 3.10 10*3/mm3 Final   • Monocytes, Absolute 09/04/2021 0.55  0.10 - 0.90 10*3/mm3 Final   • Eosinophils, Absolute 09/04/2021 0.46* 0.00 - 0.40 10*3/mm3 Final   • Basophils, Absolute 09/04/2021 0.05  0.00 - 0.20 10*3/mm3 Final   • Immature Grans, Absolute 09/04/2021 0.04  0.00 - 0.05 10*3/mm3 Final   • nRBC 09/04/2021 0.0  0.0 - 0.2 /100 WBC Final   • RBC, UA 09/04/2021 Too Numerous to Count* None Seen /HPF Final   • WBC, UA 09/04/2021 21-30* None Seen /HPF Final   • Bacteria, UA 09/04/2021 None Seen  None Seen /HPF Final   • Squamous Epithelial Cells, UA 09/04/2021 0-2  None Seen, 0-2 /HPF Final   • Hyaline Casts, UA 09/04/2021 7-12  None Seen /LPF Final   • Methodology 09/04/2021 Automated Microscopy   Final   • Urine Culture 09/04/2021 No growth   Final   • COVID19 09/04/2021 Not Detected  Not Detected - Ref. Range Final       ASSESSMENT/ PLAN:    Diagnoses and all orders for this visit:    1. Kidney stone (Primary)  Assessment & Plan:  He still has a right renal stone.  He has a follow-up with Dr. Azul tomorrow to talk about further management of this.      2. Essential hypertension  Assessment & Plan:  He checks his blood pressure infrequently outside the office.  He states that when he does it it will typically be mildly elevated.  We will go ahead and start medication today and then have him follow-up.      3. Hospital  discharge follow-up  Assessment & Plan:  Overall he is doing well as expected.  He still has a ureter stent in place that is still causes him some mild symptoms.  He has follow-up with urology, Dr. Azul.      Other orders  -     irbesartan (Avapro) 150 MG tablet; Take 1 tablet by mouth Every Night for 90 days.  Dispense: 90 tablet; Refill: 0      Orders Placed Today:     New Medications Ordered This Visit   Medications   • irbesartan (Avapro) 150 MG tablet     Sig: Take 1 tablet by mouth Every Night for 90 days.     Dispense:  90 tablet     Refill:  0        Management Plan:     An After Visit Summary was printed and given to the patient at discharge.    Follow-up: Return in about 2 months (around 11/9/2021) for Recheck.    Andrade Johnson DO 9/9/2021 12:18 EDT  This note was electronically signed.

## 2021-09-10 ENCOUNTER — PREP FOR SURGERY (OUTPATIENT)
Dept: OTHER | Facility: HOSPITAL | Age: 51
End: 2021-09-10

## 2021-09-10 ENCOUNTER — OFFICE VISIT (OUTPATIENT)
Dept: UROLOGY | Facility: CLINIC | Age: 51
End: 2021-09-10

## 2021-09-10 VITALS — WEIGHT: 248.2 LBS | BODY MASS INDEX: 36.76 KG/M2 | HEIGHT: 69 IN

## 2021-09-10 DIAGNOSIS — N20.0 NEPHROLITHIASIS: Primary | ICD-10-CM

## 2021-09-10 DIAGNOSIS — T83.122A: Primary | ICD-10-CM

## 2021-09-10 DIAGNOSIS — N20.1 URETERAL STONE: ICD-10-CM

## 2021-09-10 LAB
BILIRUB BLD-MCNC: NEGATIVE MG/DL
CLARITY, POC: CLEAR
COLOR UR: ABNORMAL
GLUCOSE UR STRIP-MCNC: NEGATIVE MG/DL
KETONES UR QL: NEGATIVE
LEUKOCYTE EST, POC: ABNORMAL
NITRITE UR-MCNC: NEGATIVE MG/ML
PH UR: 6.5 [PH] (ref 5–8)
PROT UR STRIP-MCNC: ABNORMAL MG/DL
RBC # UR STRIP: ABNORMAL /UL
SP GR UR: 1.02 (ref 1–1.03)
UROBILINOGEN UR QL: NORMAL

## 2021-09-10 PROCEDURE — 99213 OFFICE O/P EST LOW 20 MIN: CPT | Performed by: UROLOGY

## 2021-09-10 PROCEDURE — 81003 URINALYSIS AUTO W/O SCOPE: CPT | Performed by: UROLOGY

## 2021-09-10 PROCEDURE — 87086 URINE CULTURE/COLONY COUNT: CPT | Performed by: UROLOGY

## 2021-09-10 RX ORDER — SODIUM CHLORIDE 0.9 % (FLUSH) 0.9 %
10 SYRINGE (ML) INJECTION AS NEEDED
Status: CANCELLED | OUTPATIENT
Start: 2021-09-10

## 2021-09-10 RX ORDER — LEVOFLOXACIN 5 MG/ML
500 INJECTION, SOLUTION INTRAVENOUS ONCE
Status: CANCELLED | OUTPATIENT
Start: 2021-09-10 | End: 2021-09-10

## 2021-09-10 RX ORDER — SODIUM CHLORIDE 9 MG/ML
100 INJECTION, SOLUTION INTRAVENOUS CONTINUOUS
Status: CANCELLED | OUTPATIENT
Start: 2021-09-10

## 2021-09-10 RX ORDER — SODIUM CHLORIDE 0.9 % (FLUSH) 0.9 %
3 SYRINGE (ML) INJECTION EVERY 12 HOURS SCHEDULED
Status: CANCELLED | OUTPATIENT
Start: 2021-09-10

## 2021-09-10 NOTE — H&P (VIEW-ONLY)
Chief Complaint    Urologic complaint    Subjective          Paul Almendarez presents to [facility] [place] ORDERS ONLY  History of Present Illness    50-year-old gentleman presented on 9/4 to the emergency room to local his urologist with bilateral structuring stone status post surgery    Patient's been doing pretty well since surgery, no fevers or chills.  Pain is about a 1 currently.    Patient has never been on medicine for kidney stone prevention.    9/9/2021 urine culture-negative  9/4/2021 bilateral ureteroscopy, left stone removal, right stent placement - Gretel -no stent placed on the left, no mention of removal of nonobstructing left renal stones in the operative note.  Unable to identify the right stone because of a tortuous ureter, right stent was placed    9/4/2021 CT-abd/pelvis without -left kidney with a 5 mm mid ureteral stone, 3 nonobstructing stones in the left kidney 5 mm, 4 mm and 2 mm.  Right kidney has a 2 mm stone in the kidney nonobstructing and a 1.4 x 1.2 proximal ureteral stone    No cardiopulmonary history.  Takes naproxen    9/21 creatinine 1.1, GFR 68    Previous     6[place]14[place]2019 CT abdomen[place] 2 left ureteral stones one is about 4 mm and one is about 7 mm.  Also a 3 mm nonobstructing left renal stone.  7 mm right nonobstructing renal stone.    he has passed through for kidney stones, he said lithotripsy x2    No cardiopulmonary history.  Patient does not smoke.  Patient does not use blood thinner.    Past History:  Medical History: has a past medical history of Kidney stone.   Surgical History: has a past surgical history that includes Kidney stone surgery and ureteroscopy laser lithotripsy with stent insertion (Bilateral, 9/4/2021).   Family History: family history is not on file.   Social History: reports that he quit smoking about 5 years ago. His smoking use included cigarettes. He has never used smokeless tobacco. He reports previous alcohol use. He  reports that he does not use drugs.  Allergies: Iodine       Current Outpatient Medications:   •  irbesartan (Avapro) 150 MG tablet, Take 1 tablet by mouth Every Night for 90 days., Disp: 90 tablet, Rfl: 0  •  naproxen sodium (Aleve) 220 MG tablet, Take 1 tablet by mouth 2 (Two) Times a Day With Meals for 14 days., Disp: 28 tablet, Rfl: 0     Physical exam       Alert and orient x3  Well appearing, well developed, in no acute distress   Unlabored respirations  Nontender/nondistended  ctab  rrr  Grossly oriented to person, place and time, judgment is intact, normal mood and affect         Objective     Vital Signs:   There were no vitals taken for this visit.             Assessment and Plan    Diagnoses and all orders for this visit:    1. Ureteral stent displacement (CMS/[facility]) (Primary)  -     Case Request; Standing  -     COVID PRE-OP / PRE-PROCEDURE SCREENING ORDER (NO ISOLATION) - Swab, Nasopharynx; Future  -     sodium chloride 0.9 % infusion  -     levoFLOXacin (LEVAQUIN) 500 mg/100 mL D5W (premix) (LEVAQUIN) 500 mg  -     sodium chloride 0.9 % flush 3 mL  -     sodium chloride 0.9 % flush 10 mL  -     Case Request    Other orders  -     Outpatient In A Bed; Standing  -     Follow Anesthesia Guidelines / Protocol; Future  -     Provide NPO Instructions to Patient; Future  -     Follow Anesthesia Guidelines / Protocol; Standing  -     Obtain Informed Consent; Standing  -     Insert Peripheral IV; Standing  -     Saline Lock & Maintain IV Access; Standing      Patient needs to be scheduled for cystoscopy with right ureteroscopy with laser and right ureteral stent exchange.  Risks and benefits were discussed including bleeding, infection and damage to the urinary system.  We also discussed the risk of anesthesia up to and including death.  Patient voiced understanding and would like to proceed.

## 2021-09-10 NOTE — H&P
Chief Complaint    Urologic complaint    Subjective          Paul Almendarez presents to [facility] [place] ORDERS ONLY  History of Present Illness    50-year-old gentleman presented on 9/4 to the emergency room to local his urologist with bilateral structuring stone status post surgery    Patient's been doing pretty well since surgery, no fevers or chills.  Pain is about a 1 currently.    Patient has never been on medicine for kidney stone prevention.    9/9/2021 urine culture-negative  9/4/2021 bilateral ureteroscopy, left stone removal, right stent placement - Gretel -no stent placed on the left, no mention of removal of nonobstructing left renal stones in the operative note.  Unable to identify the right stone because of a tortuous ureter, right stent was placed    9/4/2021 CT-abd/pelvis without -left kidney with a 5 mm mid ureteral stone, 3 nonobstructing stones in the left kidney 5 mm, 4 mm and 2 mm.  Right kidney has a 2 mm stone in the kidney nonobstructing and a 1.4 x 1.2 proximal ureteral stone    No cardiopulmonary history.  Takes naproxen    9/21 creatinine 1.1, GFR 68    Previous     6/14/2019 CT abdomen[place]pelvis withoutL[place] Trailpatient has 2 left ureteral stones one is about 4 mm and one is about 7 mm.  Also a 3 mm nonobstructing left renal stone.  7 mm right nonobstructing renal stone.    he has passed through for kidney stones, he said lithotripsy x2    No cardiopulmonary history.  Patient does not smoke.  Patient does not use blood thinner.    Past History:  Medical History: has a past medical history of Kidney stone.   Surgical History: has a past surgical history that includes Kidney stone surgery and ureteroscopy laser lithotripsy with stent insertion (Bilateral, 9/4/2021).   Family History: family history is not on file.   Social History: reports that he quit smoking about 5 years ago. His smoking use included cigarettes. He has never used smokeless tobacco. He reports previous alcohol use. He  reports that he does not use drugs.  Allergies: Iodine       Current Outpatient Medications:   •  irbesartan (Avapro) 150 MG tablet, Take 1 tablet by mouth Every Night for 90 days., Disp: 90 tablet, Rfl: 0  •  naproxen sodium (Aleve) 220 MG tablet, Take 1 tablet by mouth 2 (Two) Times a Day With Meals for 14 days., Disp: 28 tablet, Rfl: 0     Physical exam       Alert and orient x3  Well appearing, well developed, in no acute distress   Unlabored respirations  Nontender/nondistended  ctab  rrr  Grossly oriented to person, place and time, judgment is intact, normal mood and affect         Objective     Vital Signs:   There were no vitals taken for this visit.             Assessment and Plan    Diagnoses and all orders for this visit:    1. Ureteral stent displacement (CMS/[facility]) (Primary)  -     Case Request; Standing  -     COVID PRE-OP / PRE-PROCEDURE SCREENING ORDER (NO ISOLATION) - Swab, Nasopharynx; Future  -     sodium chloride 0.9 % infusion  -     levoFLOXacin (LEVAQUIN) 500 mg/100 mL D5W (premix) (LEVAQUIN) 500 mg  -     sodium chloride 0.9 % flush 3 mL  -     sodium chloride 0.9 % flush 10 mL  -     Case Request    Other orders  -     Outpatient In A Bed; Standing  -     Follow Anesthesia Guidelines / Protocol; Future  -     Provide NPO Instructions to Patient; Future  -     Follow Anesthesia Guidelines / Protocol; Standing  -     Obtain Informed Consent; Standing  -     Insert Peripheral IV; Standing  -     Saline Lock & Maintain IV Access; Standing      Patient needs to be scheduled for cystoscopy with right ureteroscopy with laser and right ureteral stent exchange.  Risks and benefits were discussed including bleeding, infection and damage to the urinary system.  We also discussed the risk of anesthesia up to and including death.  Patient voiced understanding and would like to proceed.

## 2021-09-11 LAB — BACTERIA SPEC AEROBE CULT: NO GROWTH

## 2021-09-13 NOTE — PRE-PROCEDURE INSTRUCTIONS
Pt instructed no vitamins, supplements, or NSAIDs for 1 week prior to procedure. Pt has no medications to take AM of surgery. Verbalized understanding.

## 2021-09-17 ENCOUNTER — LAB (OUTPATIENT)
Dept: LAB | Facility: HOSPITAL | Age: 51
End: 2021-09-17

## 2021-09-17 DIAGNOSIS — T83.122A: ICD-10-CM

## 2021-09-17 PROCEDURE — C9803 HOPD COVID-19 SPEC COLLECT: HCPCS

## 2021-09-17 PROCEDURE — 87635 SARS-COV-2 COVID-19 AMP PRB: CPT

## 2021-09-18 LAB — SARS-COV-2 N GENE RESP QL NAA+PROBE: NOT DETECTED

## 2021-09-22 ENCOUNTER — ANESTHESIA EVENT (OUTPATIENT)
Dept: PERIOP | Facility: HOSPITAL | Age: 51
End: 2021-09-22

## 2021-09-23 ENCOUNTER — APPOINTMENT (OUTPATIENT)
Dept: GENERAL RADIOLOGY | Facility: HOSPITAL | Age: 51
End: 2021-09-23

## 2021-09-23 ENCOUNTER — HOSPITAL ENCOUNTER (OUTPATIENT)
Facility: HOSPITAL | Age: 51
Discharge: HOME | End: 2021-09-23
Attending: UROLOGY | Admitting: UROLOGY

## 2021-09-23 ENCOUNTER — ANESTHESIA (OUTPATIENT)
Dept: PERIOP | Facility: HOSPITAL | Age: 51
End: 2021-09-23

## 2021-09-23 VITALS
SYSTOLIC BLOOD PRESSURE: 146 MMHG | HEIGHT: 70 IN | RESPIRATION RATE: 16 BRPM | OXYGEN SATURATION: 97 % | TEMPERATURE: 97.3 F | HEART RATE: 100 BPM | WEIGHT: 248.24 LBS | DIASTOLIC BLOOD PRESSURE: 89 MMHG | BODY MASS INDEX: 35.54 KG/M2

## 2021-09-23 DIAGNOSIS — T83.122A: ICD-10-CM

## 2021-09-23 DIAGNOSIS — N20.0 NEPHROLITHIASIS: Primary | ICD-10-CM

## 2021-09-23 PROCEDURE — 88300 SURGICAL PATH GROSS: CPT | Performed by: UROLOGY

## 2021-09-23 PROCEDURE — C1769 GUIDE WIRE: HCPCS | Performed by: UROLOGY

## 2021-09-23 PROCEDURE — C1758 CATHETER, URETERAL: HCPCS | Performed by: UROLOGY

## 2021-09-23 PROCEDURE — 25010000002 ONDANSETRON PER 1 MG: Performed by: ANESTHESIOLOGY

## 2021-09-23 PROCEDURE — 25010000002 MIDAZOLAM PER 1MG: Performed by: ANESTHESIOLOGY

## 2021-09-23 PROCEDURE — C1894 INTRO/SHEATH, NON-LASER: HCPCS | Performed by: UROLOGY

## 2021-09-23 PROCEDURE — 25010000002 FENTANYL CITRATE (PF) 50 MCG/ML SOLUTION: Performed by: ANESTHESIOLOGY

## 2021-09-23 PROCEDURE — 52356 CYSTO/URETERO W/LITHOTRIPSY: CPT | Performed by: UROLOGY

## 2021-09-23 PROCEDURE — 74018 RADEX ABDOMEN 1 VIEW: CPT

## 2021-09-23 PROCEDURE — 25010000002 PROPOFOL 10 MG/ML EMULSION: Performed by: ANESTHESIOLOGY

## 2021-09-23 PROCEDURE — 25010000002 DEXAMETHASONE PER 1 MG: Performed by: ANESTHESIOLOGY

## 2021-09-23 PROCEDURE — 25010000002 LEVOFLOXACIN PER 250 MG: Performed by: UROLOGY

## 2021-09-23 PROCEDURE — 82365 CALCULUS SPECTROSCOPY: CPT | Performed by: UROLOGY

## 2021-09-23 PROCEDURE — 76000 FLUOROSCOPY <1 HR PHYS/QHP: CPT

## 2021-09-23 PROCEDURE — C2617 STENT, NON-COR, TEM W/O DEL: HCPCS | Performed by: UROLOGY

## 2021-09-23 DEVICE — STNT URETRL CLASSC DBL PIG 6F 26CM: Type: IMPLANTABLE DEVICE | Site: URETER | Status: FUNCTIONAL

## 2021-09-23 RX ORDER — PROMETHAZINE HYDROCHLORIDE 25 MG/1
25 SUPPOSITORY RECTAL ONCE AS NEEDED
Status: CANCELLED | OUTPATIENT
Start: 2021-09-23

## 2021-09-23 RX ORDER — MEPERIDINE HYDROCHLORIDE 25 MG/ML
12.5 INJECTION INTRAMUSCULAR; INTRAVENOUS; SUBCUTANEOUS
Status: DISCONTINUED | OUTPATIENT
Start: 2021-09-23 | End: 2021-09-23 | Stop reason: HOSPADM

## 2021-09-23 RX ORDER — HYDROCODONE BITARTRATE AND ACETAMINOPHEN 7.5; 325 MG/1; MG/1
1 TABLET ORAL EVERY 6 HOURS PRN
Qty: 15 TABLET | Refills: 0 | Status: SHIPPED | OUTPATIENT
Start: 2021-09-23 | End: 2021-11-19

## 2021-09-23 RX ORDER — ONDANSETRON 2 MG/ML
INJECTION INTRAMUSCULAR; INTRAVENOUS AS NEEDED
Status: DISCONTINUED | OUTPATIENT
Start: 2021-09-23 | End: 2021-09-23 | Stop reason: SURG

## 2021-09-23 RX ORDER — MAGNESIUM HYDROXIDE 1200 MG/15ML
LIQUID ORAL AS NEEDED
Status: DISCONTINUED | OUTPATIENT
Start: 2021-09-23 | End: 2021-09-23 | Stop reason: HOSPADM

## 2021-09-23 RX ORDER — LEVOFLOXACIN 5 MG/ML
500 INJECTION, SOLUTION INTRAVENOUS ONCE
Status: COMPLETED | OUTPATIENT
Start: 2021-09-23 | End: 2021-09-23

## 2021-09-23 RX ORDER — SODIUM CHLORIDE 0.9 % (FLUSH) 0.9 %
3 SYRINGE (ML) INJECTION EVERY 12 HOURS SCHEDULED
Status: DISCONTINUED | OUTPATIENT
Start: 2021-09-23 | End: 2021-09-23 | Stop reason: HOSPADM

## 2021-09-23 RX ORDER — ACETAMINOPHEN 500 MG
1000 TABLET ORAL ONCE
Status: COMPLETED | OUTPATIENT
Start: 2021-09-23 | End: 2021-09-23

## 2021-09-23 RX ORDER — ONDANSETRON 2 MG/ML
4 INJECTION INTRAMUSCULAR; INTRAVENOUS ONCE AS NEEDED
Status: DISCONTINUED | OUTPATIENT
Start: 2021-09-23 | End: 2021-09-23 | Stop reason: HOSPADM

## 2021-09-23 RX ORDER — EPHEDRINE SULFATE 50 MG/ML
INJECTION, SOLUTION INTRAVENOUS AS NEEDED
Status: DISCONTINUED | OUTPATIENT
Start: 2021-09-23 | End: 2021-09-23 | Stop reason: SURG

## 2021-09-23 RX ORDER — MIDAZOLAM HYDROCHLORIDE 2 MG/2ML
2 INJECTION, SOLUTION INTRAMUSCULAR; INTRAVENOUS ONCE
Status: COMPLETED | OUTPATIENT
Start: 2021-09-23 | End: 2021-09-23

## 2021-09-23 RX ORDER — PROMETHAZINE HYDROCHLORIDE 12.5 MG/1
12.5 TABLET ORAL ONCE AS NEEDED
Status: DISCONTINUED | OUTPATIENT
Start: 2021-09-23 | End: 2021-09-23 | Stop reason: HOSPADM

## 2021-09-23 RX ORDER — MEPERIDINE HYDROCHLORIDE 25 MG/ML
12.5 INJECTION INTRAMUSCULAR; INTRAVENOUS; SUBCUTANEOUS
Status: CANCELLED | OUTPATIENT
Start: 2021-09-23 | End: 2021-09-24

## 2021-09-23 RX ORDER — PROMETHAZINE HYDROCHLORIDE 25 MG/1
25 SUPPOSITORY RECTAL ONCE AS NEEDED
Status: DISCONTINUED | OUTPATIENT
Start: 2021-09-23 | End: 2021-09-23 | Stop reason: HOSPADM

## 2021-09-23 RX ORDER — OXYCODONE HYDROCHLORIDE 5 MG/1
5 TABLET ORAL
Status: DISCONTINUED | OUTPATIENT
Start: 2021-09-23 | End: 2021-09-23 | Stop reason: HOSPADM

## 2021-09-23 RX ORDER — SODIUM CHLORIDE 9 MG/ML
100 INJECTION, SOLUTION INTRAVENOUS CONTINUOUS
Status: DISCONTINUED | OUTPATIENT
Start: 2021-09-23 | End: 2021-09-23 | Stop reason: HOSPADM

## 2021-09-23 RX ORDER — ONDANSETRON 4 MG/1
4 TABLET, FILM COATED ORAL ONCE AS NEEDED
Status: DISCONTINUED | OUTPATIENT
Start: 2021-09-23 | End: 2021-09-23 | Stop reason: HOSPADM

## 2021-09-23 RX ORDER — SODIUM CHLORIDE 0.9 % (FLUSH) 0.9 %
10 SYRINGE (ML) INJECTION AS NEEDED
Status: DISCONTINUED | OUTPATIENT
Start: 2021-09-23 | End: 2021-09-23 | Stop reason: HOSPADM

## 2021-09-23 RX ORDER — PROPOFOL 10 MG/ML
VIAL (ML) INTRAVENOUS AS NEEDED
Status: DISCONTINUED | OUTPATIENT
Start: 2021-09-23 | End: 2021-09-23 | Stop reason: SURG

## 2021-09-23 RX ORDER — PROMETHAZINE HYDROCHLORIDE 12.5 MG/1
25 TABLET ORAL ONCE AS NEEDED
Status: CANCELLED | OUTPATIENT
Start: 2021-09-23

## 2021-09-23 RX ORDER — FENTANYL CITRATE 50 UG/ML
INJECTION, SOLUTION INTRAMUSCULAR; INTRAVENOUS AS NEEDED
Status: DISCONTINUED | OUTPATIENT
Start: 2021-09-23 | End: 2021-09-23 | Stop reason: SURG

## 2021-09-23 RX ORDER — PROMETHAZINE HYDROCHLORIDE 12.5 MG/1
25 TABLET ORAL ONCE AS NEEDED
Status: DISCONTINUED | OUTPATIENT
Start: 2021-09-23 | End: 2021-09-23 | Stop reason: HOSPADM

## 2021-09-23 RX ORDER — OXYCODONE HYDROCHLORIDE 5 MG/1
5 TABLET ORAL
Status: CANCELLED | OUTPATIENT
Start: 2021-09-23

## 2021-09-23 RX ORDER — HYDROCODONE BITARTRATE AND ACETAMINOPHEN 7.5; 325 MG/1; MG/1
1 TABLET ORAL ONCE AS NEEDED
Status: DISCONTINUED | OUTPATIENT
Start: 2021-09-23 | End: 2021-09-23 | Stop reason: HOSPADM

## 2021-09-23 RX ORDER — ONDANSETRON 2 MG/ML
4 INJECTION INTRAMUSCULAR; INTRAVENOUS ONCE AS NEEDED
Status: CANCELLED | OUTPATIENT
Start: 2021-09-23

## 2021-09-23 RX ORDER — SODIUM CHLORIDE 0.9 % (FLUSH) 0.9 %
10 SYRINGE (ML) INJECTION EVERY 12 HOURS SCHEDULED
Status: DISCONTINUED | OUTPATIENT
Start: 2021-09-23 | End: 2021-09-23 | Stop reason: HOSPADM

## 2021-09-23 RX ORDER — ACETAMINOPHEN 325 MG/1
650 TABLET ORAL ONCE
Status: DISCONTINUED | OUTPATIENT
Start: 2021-09-23 | End: 2021-09-23 | Stop reason: HOSPADM

## 2021-09-23 RX ORDER — DEXAMETHASONE SODIUM PHOSPHATE 4 MG/ML
INJECTION, SOLUTION INTRA-ARTICULAR; INTRALESIONAL; INTRAMUSCULAR; INTRAVENOUS; SOFT TISSUE AS NEEDED
Status: DISCONTINUED | OUTPATIENT
Start: 2021-09-23 | End: 2021-09-23 | Stop reason: SURG

## 2021-09-23 RX ORDER — SODIUM CHLORIDE, SODIUM LACTATE, POTASSIUM CHLORIDE, CALCIUM CHLORIDE 600; 310; 30; 20 MG/100ML; MG/100ML; MG/100ML; MG/100ML
9 INJECTION, SOLUTION INTRAVENOUS CONTINUOUS PRN
Status: DISCONTINUED | OUTPATIENT
Start: 2021-09-23 | End: 2021-09-23 | Stop reason: HOSPADM

## 2021-09-23 RX ADMIN — ONDANSETRON 4 MG: 2 INJECTION INTRAMUSCULAR; INTRAVENOUS at 08:08

## 2021-09-23 RX ADMIN — FENTANYL CITRATE 100 MCG: 50 INJECTION INTRAMUSCULAR; INTRAVENOUS at 07:26

## 2021-09-23 RX ADMIN — EPHEDRINE SULFATE 1 MG: 50 INJECTION INTRAVENOUS at 07:40

## 2021-09-23 RX ADMIN — DEXAMETHASONE SODIUM PHOSPHATE 4 MG: 4 INJECTION INTRA-ARTICULAR; INTRALESIONAL; INTRAMUSCULAR; INTRAVENOUS; SOFT TISSUE at 07:26

## 2021-09-23 RX ADMIN — EPHEDRINE SULFATE 1 MG: 50 INJECTION INTRAVENOUS at 08:10

## 2021-09-23 RX ADMIN — PROPOFOL 200 MG: 10 INJECTION, EMULSION INTRAVENOUS at 07:26

## 2021-09-23 RX ADMIN — SODIUM CHLORIDE, POTASSIUM CHLORIDE, SODIUM LACTATE AND CALCIUM CHLORIDE 9 ML/HR: 600; 310; 30; 20 INJECTION, SOLUTION INTRAVENOUS at 06:55

## 2021-09-23 RX ADMIN — ACETAMINOPHEN 1000 MG: 500 TABLET ORAL at 06:56

## 2021-09-23 RX ADMIN — LEVOFLOXACIN 500 MG: 5 INJECTION, SOLUTION INTRAVENOUS at 07:25

## 2021-09-23 RX ADMIN — MIDAZOLAM HYDROCHLORIDE 2 MG: 1 INJECTION, SOLUTION INTRAMUSCULAR; INTRAVENOUS at 07:10

## 2021-09-23 NOTE — OP NOTE
CYSTOSCOPY URETEROSCOPY  Procedure Report    Patient Name:  Paul Almendarez  YOB: 1970    Date of Surgery:  9/23/2021      Pre-op Diagnosis:   Ureteral stone       Postop diagnosis:    Same    Procedure/CPT® Codes:      Cystoscopy  Right ureteroscopy with laser and basket extraction of stone  Right ureteral stent exchange 6 x 26 with no string    Staff:  Surgeon(s):  Gwyn Azul MD    Assistant: Eddie Carney RN CSA    Anesthesia: General    Estimated Blood Loss: none    Implants:    Implant Name Type Inv. Item Serial No.  Lot No. LRB No. Used Action   STNT URETRL CLASSC DBL PIG 6F 26CM - [id number] Stent STNT URETRL CLASSC DBL PIG 6F 26CM  [facility] [id number] 1 Implanted       Specimen:          Specimens     ID Source Type Tests Collected By Collected At Frozen?    A Kidney, Right Calculus · TISSUE PATHOLOGY EXAM   Gwyn Azul MD 9/23/21 0819 No    Description: RIGHT RENAL STONE    This specimen was not marked as sent.              Findings:     2.5 cm prostate  Normal bladder    Large 1.8 cm ureteral stone on the right.  Removed its entirety.  No stones were left in the right collecting system at the end of the procedure  6 x 26 stent placed with no string        Complications: none    Description of Procedure: After informed consent patient taken to the operating room.  Patient was laid supine and placed under general anesthesia by the anesthesia team.  At this point patient was placed in dorsal lithotomy position and prepped and draped in normal sterile fashion.  A multidisciplinary timeout was undertaken documenting the correct patient site and procedure.  At this point a 22 rigid cystoscope was placed into the urethra . Bladder was normal.    Stent was seen emanating from the right ureter.  This was removed with a grasper to the urethral meatus and a Glidewire placed up without issue.    Old stent was removed and passed off the field.    I then placed a dual-lumen catheter and a stiff wire alongside the Glidewire under fluoroscopic guidance.  The dual-lumen was removed and a ureteral access sheath was placed into the distal ureter without any problem.   I could see the large stone on fluoroscopy and the ureteral access sheath was passed to about a centimeter below the stone.   I removed the obturator and wire and placed a flexible ureteroscope up the ueter.  The stone was identified.  Stone was lasered into multiple small fragments with a 365 µm laser fiber and then these pieces were basketed out with a no tip nitinol basket.  This did take about an hour because of the large size of the stone.  I then took the flexible ureteroscope up and check the rest of the ureter and the upper collecting system.  There were a lot of small fragments less than a millimeter that were too small to basket no further stones of any size.  Brought the actual sheath out under direct vision and there was no further stones.        RIght side was free of stones.  A 6 x 26 ureteral stent was then placed over the Glidewire through a rigid cystoscope without issue and had a good curl in the bladder under direct vision and a good curl in the right     renal pelvis under fluoroscopy.  Bladder was drained.  Patient tolerated the procedure well, he was taken to the postanesthesia care unit without issue.  No string was left on stent    Assistant: Eddie Carney RN CSA  was responsible for performing the following activities: Irrigation and their skilled assistance was necessary for the success of this case.    Gwyn Azul MD     Date: 9/23/2021  Time: 08:33 EDT

## 2021-09-23 NOTE — ANESTHESIA PREPROCEDURE EVALUATION
Anesthesia Evaluation     Patient summary reviewed and Nursing notes reviewed   no history of anesthetic complications:  NPO Solid Status: > 8 hours  NPO Liquid Status: > 2 hours           Airway   Dental      Pulmonary - negative pulmonary ROS and normal exam   Cardiovascular - normal exam  Exercise tolerance: good (4-7 METS)    (+) hypertension,       Neuro/Psych- negative ROS  GI/Hepatic/Renal/Endo    (+)   renal disease,     Musculoskeletal (-) negative ROS    Abdominal  - normal exam   Substance History - negative use     OB/GYN negative ob/gyn ROS         Other - negative ROS                       Anesthesia Plan    ASA 2     general   (Patient understands anesthesia not responsible for dental damage.)  intravenous induction     Anesthetic plan, all risks, benefits, and alternatives have been provided, discussed and informed consent has been obtained with: patient.  Use of blood products discussed with patient .   Plan discussed with CRNA.

## 2021-09-23 NOTE — ANESTHESIA POSTPROCEDURE EVALUATION
Patient: Paul Almendarez    Procedure Summary     Date: 09/23/21 Room / Location: [facility] OR 06 / [facility] MAIN OR    Anesthesia Start: 0720 Anesthesia Stop: 0835    Procedure: CYSTOSCOPY, RIGHT URETEROSCOPY, LASERTRIPSY, STONE BASKET EXTRACTION AND STENT EXCHANGE (Right Ureter) Diagnosis:       Right ureteral stone      (Ureteral stone)    Surgeons: Gwyn Azul MD Provider: Suzy Tompkins MD    Anesthesia Type: general ASA Status: 2          Anesthesia Type: general    Vitals  Vitals Value Taken Time   /104 09/23/21 0909   Temp 36.3 °C (97.4 °F) 09/23/21 0835   Pulse 99 09/23/21 0911   Resp 16 09/23/21 0905   SpO2 88 % 09/23/21 0911   Vitals shown include unvalidated device data.        Post Anesthesia Care and Evaluation    Patient location during evaluation: bedside  Patient participation: complete - patient participated  Level of consciousness: awake  Pain management: adequate  Airway patency: patent  Anesthetic complications: No anesthetic complications  PONV Status: none  Cardiovascular status: acceptable and stable  Respiratory status: acceptable  Hydration status: acceptable    Comments: An Anesthesiologist personally participated in the most demanding procedures (including induction and emergence if applicable) in the anesthesia plan, monitored the course of anesthesia administration at frequent intervals and remained physically present and available for immediate diagnosis and treatment of emergencies.

## 2021-09-28 LAB
LAB AP CASE REPORT: NORMAL
LAB AP CLINICAL INFORMATION: NORMAL
PATH REPORT.FINAL DX SPEC: NORMAL
PATH REPORT.GROSS SPEC: NORMAL

## 2021-09-30 LAB
CALCIUM OXALATE DIHYDRATE MFR STONE IR: 30 %
COLOR STONE: NORMAL
COM MFR STONE: 40 %
COMPN STONE: NORMAL
HYDROXYAPATITE 24H ENGDIFF UR: 30 %
LABORATORY COMMENT REPORT: NORMAL
Lab: NORMAL
Lab: NORMAL
PHOTO: NORMAL
SIZE STONE: NORMAL MM
SPEC SOURCE SUBJ: NORMAL
WT STONE: 116 MG

## 2021-10-01 ENCOUNTER — OFFICE VISIT (OUTPATIENT)
Dept: UROLOGY | Facility: CLINIC | Age: 51
End: 2021-10-01

## 2021-10-01 VITALS — RESPIRATION RATE: 18 BRPM | WEIGHT: 248 LBS | BODY MASS INDEX: 35.5 KG/M2 | HEIGHT: 70 IN

## 2021-10-01 DIAGNOSIS — N20.0 NEPHROLITHIASIS: Primary | ICD-10-CM

## 2021-10-01 PROCEDURE — 52310 CYSTOSCOPY AND TREATMENT: CPT | Performed by: UROLOGY

## 2021-10-01 NOTE — PROGRESS NOTES
Cytoscopy with Stent Removal     Pre-Procedure Diagnosis: Nephrolithiasis    Post-Procedural Diagnosis: Same    Procedure Performed: Cystoscopy with Ureteral Stent Removal     Description of the Procedure:      was appropriately identified and brought to the cytoscopy suite. A timeout was undertaken documenting the correct patient, site, and procedure. The patient was prepped in a normal sterile fashion. A flexible cytoscope was then introduced into the bladder. The stent was identified and grasped with endoscopic graspers. The entire stent was removed and passed off the field. Patient tolerated the procedure well. There were no intraprocedural complications.        Assessment and Plan   Diagnoses and all orders for this visit:    1. Nephrolithiasis (Primary)          Current Outpatient Medications:   •  HYDROcodone-acetaminophen (NORCO) 7.5-325 MG per tablet, Take 1 tablet by mouth Every 6 (Six) Hours As Needed for Moderate Pain  (Pain)., Disp: 15 tablet, Rfl: 0  •  irbesartan (Avapro) 150 MG tablet, Take 1 tablet by mouth Every Night for 90 days., Disp: 90 tablet, Rfl: 0    Follow-up in 1 month with renal ultrasound.      Electronically Signed by: Gwyn Azul MD on 10/01/2021

## 2021-10-25 ENCOUNTER — HOSPITAL ENCOUNTER (OUTPATIENT)
Dept: ULTRASOUND IMAGING | Facility: HOSPITAL | Age: 51
Discharge: HOME | End: 2021-10-25
Admitting: UROLOGY

## 2021-10-25 DIAGNOSIS — N20.0 NEPHROLITHIASIS: ICD-10-CM

## 2021-10-25 PROCEDURE — 76775 US EXAM ABDO BACK WALL LIM: CPT

## 2021-11-03 NOTE — PROGRESS NOTES
Chief Complaint    Urologic complaint    Subjective          Paul Almendarez presents to [facility] UROLOGY  History of Present Illness    50-year-old gentleman presented on 9/4  with bilateral obstructing stones status post surgery    No gross hematuria, no pain.    9/23/2021 right ureteroscopy with laser stent with no string -2.5 cm prostate.  1.8 cm right ureteral stone.  Removed in its entirety no other stones  10/21  Ca Oxalate - Monohydrate 40, calcium oxalate dihydrate 30, hydroxyapatite 30    Right Stent removed in the office    Has never been on medication for kidney stone prevention.    Renal ultrasound shows the hydronephrosis has resolved.    Previous      9/4/2021 bilateral ureteroscopy, left stone removal, right stent placement - Gretel -no stent placed on the left, no mention of removal of nonobstructing left renal stones in the operative note.  Unable to identify the right stone because of a tortuous ureter, right stent was placed    9/4/2021 CT-abd/pelvis without -left kidney with a 5 mm mid ureteral stone, 3 nonobstructing stones in the left kidney 5 mm, 4 mm and 2 mm.  Right kidney has a 2 mm stone in the kidney nonobstructing and a 1.4 x 1.2 proximal ureteral stone    No cardiopulmonary history.  Takes naproxen    9/21 creatinine 1.1, GFR 68     6/14/2019 CT abdomen[place] 2 left ureteral stones one is about 4 mm and one is about 7 mm.  Also a 3 mm nonobstructing left renal stone.  7 mm right nonobstructing renal stone.    he has passed through for kidney stones, he said lithotripsy x2    No cardiopulmonary history.  Patient does not smoke.  Patient does not use blood thinner.    Results for orders placed or performed during the hospital encounter of 09/23/21   STONE ANALYSIS - Calculus,    Specimen: Calculus   Result Value Ref Range    Stone Source Comment     Color Brown     Size 3x3 mm    Stone Weight 116 mg    Composition Comment     Ca  Oxalate - Monohydrate, Stone 40 %    Ca Oxalate-Dihydrate, Stone 30 %    HYDROXYAPATITE 30 %    Photo Comment     Comments: Comment     Please note Comment     Disclaimer: Comment    Tissue Pathology Exam    Specimen: Kidney, Right; Calculus   Result Value Ref Range    Case Report       Surgical Pathology Report                         Case: [id number]                                  Authorizing Provider:  Gwyn Azul MD      Collected:           09/23/2021 08:19 AM          [facility] MAIN Received:            09/23/2021 11:47 AM                                 OR                                                                           Pathologist:           Bhupendra Curiel MD                                                            Specimen:    Kidney, Right, RIGHT RENAL STONE                                                           Clinical Information      Final Diagnosis       Right kidney, stone, removal:   - Stone, sent for chemical analysis (gross only diagnosis)         Gross Description       Right renal stone: Received in a dry container are several dark brown irregular jagged calculi measuring 1 cm in greatest aggregate dimension.  Stones are forwarded for composition analysis.  The specimen is also examined by ANDREEA Gross only at this facility.  [facility]           Past History:  Medical History: has a past medical history of Hypertension and Kidney stone.   Surgical History: has a past surgical history that includes Kidney stone surgery; ureteroscopy laser lithotripsy with stent insertion (Bilateral, 9/4/2021); and cystoscopy ureteroscopy (Right, 9/23/2021).   Family History: family history is not on file.   Social History: reports that he quit smoking about 5 years ago. His smoking use included cigarettes. He has never used smokeless tobacco. He reports current alcohol use. He reports that he does not use drugs.  Allergies: Iodine       Current Outpatient  Medications:   •  HYDROcodone-acetaminophen (NORCO) 7.5-325 MG per tablet, Take 1 tablet by mouth Every 6 (Six) Hours As Needed for Moderate Pain  (Pain)., Disp: 15 tablet, Rfl: 0  •  irbesartan (Avapro) 150 MG tablet, Take 1 tablet by mouth Every Night for 90 days., Disp: 90 tablet, Rfl: 0     Physical exam       Alert and orient x3  Well appearing, well developed, in no acute distress   Unlabored respirations    Grossly oriented to person, place and time, judgment is intact, normal mood and affect         Objective     Vital Signs:   There were no vitals taken for this visit.             Assessment and Plan    There are no diagnoses linked to this encounter.      Nephrolithiasis      The patient was counseled on the preventative measures of kidney stones today.  This included increasing fluid intake to make at least 1.5 ml daily, decreasing meat intake, decreasing salt intake and taking in a normal amount of calcium (1000 mg daily).  Information handout given on this today.      We also discussed the DASH diet today for stone prevention and handout was given    Patient would like a metabolic stone work-up -  will get him set up for 24 urine labs he will follow-up in 10 weeks.

## 2021-11-05 ENCOUNTER — OFFICE VISIT (OUTPATIENT)
Dept: UROLOGY | Facility: CLINIC | Age: 51
End: 2021-11-05

## 2021-11-05 ENCOUNTER — TELEPHONE (OUTPATIENT)
Dept: FAMILY MEDICINE CLINIC | Facility: CLINIC | Age: 51
End: 2021-11-05

## 2021-11-05 VITALS — RESPIRATION RATE: 17 BRPM | BODY MASS INDEX: 37 KG/M2 | HEIGHT: 69 IN | WEIGHT: 249.8 LBS

## 2021-11-05 DIAGNOSIS — N20.0 NEPHROLITHIASIS: Primary | ICD-10-CM

## 2021-11-05 PROCEDURE — 99213 OFFICE O/P EST LOW 20 MIN: CPT | Performed by: UROLOGY

## 2021-11-05 RX ORDER — OLMESARTAN MEDOXOMIL 20 MG/1
20 TABLET ORAL DAILY
Qty: 90 TABLET | Refills: 1 | Status: SHIPPED | OUTPATIENT
Start: 2021-11-05 | End: 2022-11-29 | Stop reason: SDUPTHER

## 2021-11-05 NOTE — TELEPHONE ENCOUNTER
I sent in an alternative medicine which is very similar.  He needs to monitor his blood pressure as a result of this change.  Only the irbesartan from a certain generic  has been recalled due to contamination.

## 2021-11-05 NOTE — TELEPHONE ENCOUNTER
Caller: Paul Almendarez    Relationship: Self    Best call back number: [phone]    What medications are you currently taking:   Current Outpatient Medications on File Prior to Visit   Medication Sig Dispense Refill   • HYDROcodone-acetaminophen (NORCO) 7.5-325 MG per tablet Take 1 tablet by mouth Every 6 (Six) Hours As Needed for Moderate Pain  (Pain). 15 tablet 0   • irbesartan (Avapro) 150 MG tablet Take 1 tablet by mouth Every Night for 90 days. 90 tablet 0     No current facility-administered medications on file prior to visit.        Which medication are you concerned about: irbesartan (Avapro) 150 MG tablet    Who prescribed you this medication: EMILIO GERMAN DO    What are your concerns: PATIENT SAW ON THE NEWS THAT THIS MEDICATION HAS BEEN RECALLED AND WOULD LIKE TO KNOW IF DOCTOR MAXI WOULD LIKE TO REPLACE IT. PATIENT HAS STOPPED TAKING THIS MEDICATION COMPLETELY.

## 2021-11-19 ENCOUNTER — OFFICE VISIT (OUTPATIENT)
Dept: FAMILY MEDICINE CLINIC | Facility: CLINIC | Age: 51
End: 2021-11-19

## 2021-11-19 VITALS
WEIGHT: 249.8 LBS | DIASTOLIC BLOOD PRESSURE: 81 MMHG | TEMPERATURE: 97.4 F | OXYGEN SATURATION: 97 % | BODY MASS INDEX: 36.89 KG/M2 | HEART RATE: 103 BPM | SYSTOLIC BLOOD PRESSURE: 136 MMHG

## 2021-11-19 DIAGNOSIS — I10 ESSENTIAL HYPERTENSION: Primary | ICD-10-CM

## 2021-11-19 LAB
ALBUMIN SERPL-MCNC: 4.2 G/DL (ref 3.5–5.2)
ALBUMIN/GLOB SERPL: 1.5 G/DL
ALP SERPL-CCNC: 108 U/L (ref 39–117)
ALT SERPL W P-5'-P-CCNC: 24 U/L (ref 1–41)
ANION GAP SERPL CALCULATED.3IONS-SCNC: 7.4 MMOL/L (ref 5–15)
AST SERPL-CCNC: 15 U/L (ref 1–40)
BILIRUB SERPL-MCNC: 0.3 MG/DL (ref 0–1.2)
BUN SERPL-MCNC: 14 MG/DL (ref 6–20)
BUN/CREAT SERPL: 14 (ref 7–25)
CALCIUM SPEC-SCNC: 9.5 MG/DL (ref 8.6–10.5)
CHLORIDE SERPL-SCNC: 104 MMOL/L (ref 98–107)
CHOLEST SERPL-MCNC: 235 MG/DL (ref 0–200)
CO2 SERPL-SCNC: 27.6 MMOL/L (ref 22–29)
CREAT SERPL-MCNC: 1 MG/DL (ref 0.76–1.27)
GFR SERPL CREATININE-BSD FRML MDRD: 78 ML/MIN/1.73
GLOBULIN UR ELPH-MCNC: 2.8 GM/DL
GLUCOSE SERPL-MCNC: 84 MG/DL (ref 65–99)
HDLC SERPL-MCNC: 25 MG/DL (ref 40–60)
LDLC SERPL CALC-MCNC: 122 MG/DL (ref 0–100)
LDLC/HDLC SERPL: 4.49 {RATIO}
POTASSIUM SERPL-SCNC: 4.1 MMOL/L (ref 3.5–5.2)
PROT SERPL-MCNC: 7 G/DL (ref 6–8.5)
SODIUM SERPL-SCNC: 139 MMOL/L (ref 136–145)
TRIGL SERPL-MCNC: 489 MG/DL (ref 0–150)
VLDLC SERPL-MCNC: 88 MG/DL (ref 5–40)

## 2021-11-19 PROCEDURE — 36415 COLL VENOUS BLD VENIPUNCTURE: CPT | Performed by: FAMILY MEDICINE

## 2021-11-19 PROCEDURE — 80053 COMPREHEN METABOLIC PANEL: CPT | Performed by: FAMILY MEDICINE

## 2021-11-19 PROCEDURE — 80061 LIPID PANEL: CPT | Performed by: FAMILY MEDICINE

## 2021-11-19 PROCEDURE — 99213 OFFICE O/P EST LOW 20 MIN: CPT | Performed by: FAMILY MEDICINE

## 2021-11-19 NOTE — PROGRESS NOTES
Chief Complaint   Patient presents with   • Follow-up     4 months        Subjective     Paul Almendarez  has a past medical history of Kidney stone.    Hypertension  This is a chronic problem. The current episode started more than 1 month ago. The problem has been rapidly improving since onset. The problem is controlled. Pertinent negatives include no anxiety, blurred vision, chest pain, headaches, malaise/fatigue, neck pain, orthopnea, palpitations, peripheral edema, PND, shortness of breath or sweats. There are no associated agents to hypertension. Risk factors for coronary artery disease include sedentary lifestyle. The current treatment provides moderate improvement. There are no compliance problems.        PHQ-2 Depression Screening  Little interest or pleasure in doing things?     Feeling down, depressed, or hopeless?     PHQ-2 Total Score     PHQ-9 Depression Screening  Little interest or pleasure in doing things?     Feeling down, depressed, or hopeless?     Trouble falling or staying asleep, or sleeping too much?     Feeling tired or having little energy?     Poor appetite or overeating?     Feeling bad about yourself - or that you are a failure or have let yourself or your family down?     Trouble concentrating on things, such as reading the newspaper or watching television?     Moving or speaking so slowly that other people could have noticed? Or the opposite - being so fidgety or restless that you have been moving around a lot more than usual?     Thoughts that you would be better off dead, or of hurting yourself in some way?     PHQ-9 Total Score     If you checked off any problems, how difficult have these problems made it for you to do your work, take care of things at home, or get along with other people?       Allergies   Allergen Reactions   • Iodine Hives, Shortness Of Breath and Swelling       Prior to Admission medications    Medication Sig Start Date End Date Taking? Authorizing Provider    olmesartan (Benicar) 20 MG tablet Take 1 tablet by mouth Daily for 90 days. 11/5/21 2/3/22 Yes Andrade Johnson,    HYDROcodone-acetaminophen (NORCO) 7.5-325 MG per tablet Take 1 tablet by mouth Every 6 (Six) Hours As Needed for Moderate Pain  (Pain). 21   Gwyn Azul MD        Patient Active Problem List   Diagnosis   • Ureteral stone   • Kidney stone   • Hospital discharge follow-up   • Essential hypertension   • Nephrolithiasis        Past Surgical History:   Procedure Laterality Date   • CYSTOSCOPY URETEROSCOPY Right 2021    Procedure: CYSTOSCOPY, RIGHT URETEROSCOPY, LASERTRIPSY, STONE BASKET EXTRACTION AND STENT EXCHANGE;  Surgeon: Gwyn Azul MD;  Location: [facility];  Service: Urology;  Laterality: Right;   • KIDNEY STONE SURGERY     • URETEROSCOPY LASER LITHOTRIPSY WITH STENT INSERTION Bilateral 2021    Procedure: CYSTOSCOPY, BILATERAL URETEROSCOPY  AND  LEFT LASER LITHOTRIPSY WITH STONE REMOVAL ,  RIGHT RETROGRADE AND RIGHT URETERAL  STENT INSERTION;  Surgeon: Andre Majano MD;  Location: [facility];  Service: Urology;  Laterality: Bilateral;       Social History     Socioeconomic History   • Marital status:    Tobacco Use   • Smoking status: Former Smoker     Types: Cigarettes     Quit date: 2016     Years since quittin.8   • Smokeless tobacco: Never Used   Vaping Use   • Vaping Use: Never used   Substance and Sexual Activity   • Alcohol use: Yes     Comment: occasionally   • Drug use: Never   • Sexual activity: Defer       No family history on file.    Family history, surgical history, past medical history, Allergies and med's reviewed with patient today and updated in [facility] EMR.     ROS:  Review of Systems   Constitutional: Negative for fatigue and malaise/fatigue.   Eyes: Negative for blurred vision.   Respiratory: Negative for cough, chest tightness, shortness of breath and wheezing.    Cardiovascular: Negative for chest pain, palpitations,  orthopnea and PND.   Musculoskeletal: Negative for neck pain.   Neurological: Negative for headache.       OBJECTIVE:  Vitals:    11/19/21 1551   BP: 136/81   BP Location: Left arm   Patient Position: Sitting   Cuff Size: Adult   Pulse: 103   Temp: 97.4 °F (36.3 °C)   TempSrc: Temporal   SpO2: 97%   Weight: 113 kg (249 lb 12.8 oz)     No exam data present   Body mass index is 36.89 kg/m².  No LMP for male patient.    Physical Exam  Vitals and nursing note reviewed.   Constitutional:       General: He is not in acute distress.     Appearance: Normal appearance. He is obese.   HENT:      Head: Normocephalic and atraumatic.   Cardiovascular:      Rate and Rhythm: Normal rate and regular rhythm.      Heart sounds: Normal heart sounds. No murmur heard.      Pulmonary:      Effort: Pulmonary effort is normal.      Breath sounds: Normal breath sounds. No wheezing, rhonchi or rales.   Neurological:      Mental Status: He is alert.         Procedures    No visits with results within 30 Day(s) from this visit.   Latest known visit with results is:   Admission on 09/23/2021, Discharged on 09/23/2021   Component Date Value Ref Range Status   • Case Report 09/23/2021    Final                    Value:Surgical Pathology Report                         Case: [id number]                                  Authorizing Provider:  Gwyn Azul MD      Collected:           09/23/2021 08:19 AM          Ordering Location:     [facility] MAIN Received:            09/23/2021 11:47 AM                                 OR                                                                           Pathologist:           Bhupendra Curiel MD                                                            Specimen:    Kidney, Right, RIGHT RENAL STONE                                                          • Clinical Information 09/23/2021    Final    This result contains rich text formatting which cannot be displayed here.   • Final Diagnosis  09/23/2021    Final                    Value:This result contains rich text formatting which cannot be displayed here.   • Gross Description 09/23/2021    Final                    Value:This result contains rich text formatting which cannot be displayed here.   • Stone Source 09/23/2021 Comment   Final    Right Kidney   • Color 09/23/2021 Brown   Final   • Size 09/23/2021 3x3  mm Final    Multiple pieces received.  Dimensions of the largest piece  reported.   • Stone Weight 09/23/2021 116  mg Final   • Composition 09/23/2021 Comment   Final    Percentage (Represents the % composition)   • Ca Oxalate - Monohydrate, Stone 09/23/2021 40  % Final   • Ca Oxalate-Dihydrate, Stone 09/23/2021 30  % Final   • HYDROXYAPATITE 09/23/2021 30  % Final   • Photo 09/23/2021 Comment   Final    Photograph will follow under a separate cover   • Comments: 09/23/2021 Comment   Final    Physician questions regarding [facility] contact  [facility] at: [phone].   • Please note 09/23/2021 Comment   Final    [facility] report will follow via computer, mail or   delivery.   • Disclaimer: 09/23/2021 Comment   Final    This test was developed and its performance characteristics  determined by [employer].  It has not been cleared or approved  by the Food and Drug Administration.       ASSESSMENT/ PLAN:    Diagnoses and all orders for this visit:    1. Essential hypertension (Primary)  Assessment & Plan:  His blood pressure is good here as well as outside the office.  Had to switch his irbesartan to olmesartan due to contamination of the previous product.  He is tolerating it well.  We will update his labs here today.        Orders Placed Today:     No orders of the defined types were placed in this encounter.       Management Plan:     An After Visit Summary was printed and given to the patient at discharge.    Follow-up: Return in about 6 months (around 5/19/2022) for Recheck.    Andrade Johnson, DO 11/19/2021 16:30 EST  This  note was electronically signed.

## 2021-11-19 NOTE — ASSESSMENT & PLAN NOTE
His blood pressure is good here as well as outside the office.  Had to switch his irbesartan to olmesartan due to contamination of the previous product.  He is tolerating it well.  We will update his labs here today.

## 2021-11-22 ENCOUNTER — TELEPHONE (OUTPATIENT)
Dept: FAMILY MEDICINE CLINIC | Facility: CLINIC | Age: 51
End: 2021-11-22

## 2022-01-18 ENCOUNTER — TELEPHONE (OUTPATIENT)
Dept: UROLOGY | Facility: CLINIC | Age: 52
End: 2022-01-18

## 2022-01-18 NOTE — TELEPHONE ENCOUNTER
LVM letting patient know we will need to reschedule his appointment due to not having his BMP, uric acid, and 24h urine done. Instructed patient to call office.

## 2022-01-21 ENCOUNTER — TELEPHONE (OUTPATIENT)
Dept: UROLOGY | Facility: CLINIC | Age: 52
End: 2022-01-21

## 2022-02-10 ENCOUNTER — TELEPHONE (OUTPATIENT)
Dept: UROLOGY | Facility: CLINIC | Age: 52
End: 2022-02-10

## 2022-02-14 ENCOUNTER — TELEPHONE (OUTPATIENT)
Dept: UROLOGY | Facility: CLINIC | Age: 52
End: 2022-02-14

## 2022-02-14 NOTE — TELEPHONE ENCOUNTER
Caller: Paul Almendarez    Relationship: Self    Best call back number: [phone]      Who are you requesting to speak with (clinical staff, provider,  specific staff member): ANYONE     Do you know the name of the person who called: NO    What was the call regarding: N/A    Do you require a callback:YES,OK TO LEAVE DETAILED MESSAGE IF NO ANSWER      [facility] ATTEMPTED TO WARM TRANSFER BUT WAS UNABLE TO REACH PRACTICE. HUB SEES TELEPHONE ENCOUNTER FROM 2/10/22 IN CHART.

## 2022-02-15 NOTE — TELEPHONE ENCOUNTER
Spoke to patient.  He is aware he still needs to do the 24hour urine test and the lab tests prior to coming in for his 3-4-22 appt.

## 2022-03-04 ENCOUNTER — TELEPHONE (OUTPATIENT)
Dept: UROLOGY | Facility: CLINIC | Age: 52
End: 2022-03-04

## 2022-03-09 ENCOUNTER — TELEPHONE (OUTPATIENT)
Dept: UROLOGY | Facility: CLINIC | Age: 52
End: 2022-03-09

## 2022-03-10 NOTE — TELEPHONE ENCOUNTER
3RD CALL TO PT TO [facility] APPT FROM 3/4/2022,  NEEDS TO R/S DWIGHT PRIOR TO APPT NO ANSWER/LMOM    PLEASE ADVISE IF ANYTHING FURTHER TO DO

## 2022-03-22 ENCOUNTER — TELEPHONE (OUTPATIENT)
Dept: UROLOGY | Facility: CLINIC | Age: 52
End: 2022-03-22

## 2022-03-22 NOTE — TELEPHONE ENCOUNTER
Left message for patient to call back regarding outstanding labs and to reschedule follow up with Jorge Alberto.

## 2022-05-20 ENCOUNTER — OFFICE VISIT (OUTPATIENT)
Dept: FAMILY MEDICINE CLINIC | Facility: CLINIC | Age: 52
End: 2022-05-20

## 2022-05-20 VITALS
HEART RATE: 113 BPM | OXYGEN SATURATION: 97 % | WEIGHT: 251.2 LBS | DIASTOLIC BLOOD PRESSURE: 84 MMHG | SYSTOLIC BLOOD PRESSURE: 134 MMHG | BODY MASS INDEX: 37.2 KG/M2 | TEMPERATURE: 97.8 F | HEIGHT: 69 IN

## 2022-05-20 DIAGNOSIS — I10 ESSENTIAL HYPERTENSION: Primary | ICD-10-CM

## 2022-05-20 DIAGNOSIS — N20.0 NEPHROLITHIASIS: ICD-10-CM

## 2022-05-20 PROBLEM — E78.1 HYPERTRIGLYCERIDEMIA: Status: ACTIVE | Noted: 2019-12-20

## 2022-05-20 LAB
ALBUMIN SERPL-MCNC: 4.3 G/DL (ref 3.5–5.2)
ALBUMIN/GLOB SERPL: 1.2 G/DL
ALP SERPL-CCNC: 116 U/L (ref 39–117)
ALT SERPL W P-5'-P-CCNC: 34 U/L (ref 1–41)
ANION GAP SERPL CALCULATED.3IONS-SCNC: 11.4 MMOL/L (ref 5–15)
ARTICHOKE IGE QN: 116 MG/DL (ref 0–100)
AST SERPL-CCNC: 21 U/L (ref 1–40)
BILIRUB SERPL-MCNC: 0.2 MG/DL (ref 0–1.2)
BUN SERPL-MCNC: 15 MG/DL (ref 6–20)
BUN/CREAT SERPL: 13.3 (ref 7–25)
CALCIUM SPEC-SCNC: 10 MG/DL (ref 8.6–10.5)
CHLORIDE SERPL-SCNC: 102 MMOL/L (ref 98–107)
CHOLEST SERPL-MCNC: 252 MG/DL (ref 0–200)
CO2 SERPL-SCNC: 26.6 MMOL/L (ref 22–29)
CREAT SERPL-MCNC: 1.13 MG/DL (ref 0.76–1.27)
EGFRCR SERPLBLD CKD-EPI 2021: 78.2 ML/MIN/1.73
GLOBULIN UR ELPH-MCNC: 3.5 GM/DL
GLUCOSE SERPL-MCNC: 99 MG/DL (ref 65–99)
HDLC SERPL-MCNC: 23 MG/DL (ref 40–60)
LDLC SERPL CALC-MCNC: ABNORMAL MG/DL
LDLC/HDLC SERPL: ABNORMAL {RATIO}
POTASSIUM SERPL-SCNC: 4.5 MMOL/L (ref 3.5–5.2)
PROT SERPL-MCNC: 7.8 G/DL (ref 6–8.5)
SODIUM SERPL-SCNC: 140 MMOL/L (ref 136–145)
TRIGL SERPL-MCNC: 1171 MG/DL (ref 0–150)
URATE SERPL-MCNC: 6 MG/DL (ref 3.4–7)
VLDLC SERPL-MCNC: ABNORMAL MG/DL

## 2022-05-20 PROCEDURE — 84550 ASSAY OF BLOOD/URIC ACID: CPT | Performed by: FAMILY MEDICINE

## 2022-05-20 PROCEDURE — 80061 LIPID PANEL: CPT | Performed by: FAMILY MEDICINE

## 2022-05-20 PROCEDURE — 99213 OFFICE O/P EST LOW 20 MIN: CPT | Performed by: FAMILY MEDICINE

## 2022-05-20 PROCEDURE — 36415 COLL VENOUS BLD VENIPUNCTURE: CPT | Performed by: FAMILY MEDICINE

## 2022-05-20 PROCEDURE — 83721 ASSAY OF BLOOD LIPOPROTEIN: CPT | Performed by: FAMILY MEDICINE

## 2022-05-20 PROCEDURE — 80053 COMPREHEN METABOLIC PANEL: CPT | Performed by: FAMILY MEDICINE

## 2022-05-20 NOTE — ASSESSMENT & PLAN NOTE
His blood pressure is good here today and it has been good at home.  Did caution about using any pseudoephedrine products as it can raise his blood pressure.  We will update his labs and continue his current meds.

## 2022-05-20 NOTE — PROGRESS NOTES
Chief Complaint   Patient presents with   • Hypertension        Subjective     Paul Almendarez  has a past medical history of Kidney stone.    Hypertension  This is a recurrent problem. The current episode started more than 1 month ago. The problem has been rapidly improving since onset. The problem is controlled. Pertinent negatives include no anxiety, blurred vision, chest pain, headaches, malaise/fatigue, neck pain, orthopnea, palpitations, peripheral edema, PND, shortness of breath or sweats. There are no associated agents to hypertension. Risk factors for coronary artery disease include dyslipidemia and obesity. Current antihypertension treatment includes angiotensin blockers. The current treatment provides significant improvement. There are no compliance problems.  There is no history of angina, kidney disease, CAD/MI or CVA. There is no history of chronic renal disease, hyperaldosteronism, a hypertension causing med, renovascular disease or sleep apnea.       PHQ-2 Depression Screening  Little interest or pleasure in doing things?     Feeling down, depressed, or hopeless?     PHQ-2 Total Score     PHQ-9 Depression Screening  Little interest or pleasure in doing things?     Feeling down, depressed, or hopeless?     Trouble falling or staying asleep, or sleeping too much?     Feeling tired or having little energy?     Poor appetite or overeating?     Feeling bad about yourself - or that you are a failure or have let yourself or your family down?     Trouble concentrating on things, such as reading the newspaper or watching television?     Moving or speaking so slowly that other people could have noticed? Or the opposite - being so fidgety or restless that you have been moving around a lot more than usual?     Thoughts that you would be better off dead, or of hurting yourself in some way?     PHQ-9 Total Score     If you checked off any problems, how difficult have these problems made it for you to do your  work, take care of things at home, or get along with other people?       Allergies   Allergen Reactions   • Iodine Hives, Shortness Of Breath and Swelling       Prior to Admission medications    Medication Sig Start Date End Date Taking? Authorizing Provider   Phenylephrine-Ibuprofen (Sudafed PE Head Congestion)  MG tablet Take  by mouth.   Yes Provider, MD Orly   olmesartan (Benicar) 20 MG tablet Take 1 tablet by mouth Daily for 90 days. 11/5/21 2/3/22  Andrade Johnson, DO        Patient Active Problem List   Diagnosis   • Ureteral stone   • Kidney stone   • Hospital discharge follow-up   • Essential hypertension   • Nephrolithiasis   • Hypertriglyceridemia        Past Surgical History:   Procedure Laterality Date   • CYSTOSCOPY URETEROSCOPY Right 2021    Procedure: CYSTOSCOPY, RIGHT URETEROSCOPY, LASERTRIPSY, STONE BASKET EXTRACTION AND STENT EXCHANGE;  Surgeon: Gwyn Azul MD;  Location: [facility];  Service: Urology;  Laterality: Right;   • KIDNEY STONE SURGERY     • URETEROSCOPY LASER LITHOTRIPSY WITH STENT INSERTION Bilateral 2021    Procedure: CYSTOSCOPY, BILATERAL URETEROSCOPY  AND  LEFT LASER LITHOTRIPSY WITH STONE REMOVAL ,  RIGHT RETROGRADE AND RIGHT URETERAL  STENT INSERTION;  Surgeon: Andre Majano MD;  Location: [facility];  Service: Urology;  Laterality: Bilateral;       Social History     Socioeconomic History   • Marital status:    Tobacco Use   • Smoking status: Former Smoker     Types: Cigarettes     Quit date: 2016     Years since quittin.3   • Smokeless tobacco: Never Used   Vaping Use   • Vaping Use: Never used   Substance and Sexual Activity   • Alcohol use: Yes     Comment: occasionally   • Drug use: Never   • Sexual activity: Defer       History reviewed. No pertinent family history.    Family history, surgical history, past medical history, Allergies and med's reviewed with patient today and updated in [employer] EMR.     ROS:  Review  "of Systems   Constitutional: Negative for fatigue and malaise/fatigue.   HENT: Positive for congestion, postnasal drip and rhinorrhea.    Eyes: Positive for discharge and itching. Negative for blurred vision.   Respiratory: Negative for cough, chest tightness, shortness of breath and wheezing.    Cardiovascular: Negative for chest pain, palpitations, orthopnea and PND.   Musculoskeletal: Negative for neck pain.   Allergic/Immunologic: Positive for environmental allergies.   Neurological: Negative for headache.       OBJECTIVE:  Vitals:    05/20/22 1600   BP: 134/84   BP Location: Right arm   Patient Position: Sitting   Cuff Size: Adult   Pulse: 113   Temp: 97.8 °F (36.6 °C)   TempSrc: Temporal   SpO2: 97%   Weight: 114 kg (251 lb 3.2 oz)   Height: 175.3 cm (69\")     No exam data present   Body mass index is 37.1 kg/m².  No LMP for male patient.    Physical Exam  Vitals and nursing note reviewed.   Constitutional:       General: He is not in acute distress.     Appearance: Normal appearance. He is obese.   HENT:      Head: Normocephalic.      Right Ear: Tympanic membrane, ear canal and external ear normal.      Left Ear: Tympanic membrane, ear canal and external ear normal.      Nose: Nose normal.      Mouth/Throat:      Mouth: Mucous membranes are moist.      Pharynx: Oropharynx is clear.   Eyes:      General: No scleral icterus.     Conjunctiva/sclera: Conjunctivae normal.      Pupils: Pupils are equal, round, and reactive to light.   Cardiovascular:      Rate and Rhythm: Normal rate and regular rhythm.      Pulses: Normal pulses.      Heart sounds: Normal heart sounds. No murmur heard.  Pulmonary:      Effort: Pulmonary effort is normal.      Breath sounds: Normal breath sounds. No wheezing, rhonchi or rales.   Musculoskeletal:      Cervical back: Neck supple. No rigidity or tenderness.   Lymphadenopathy:      Cervical: No cervical adenopathy.   Skin:     General: Skin is warm and dry.      Coloration: Skin is not " jaundiced.      Findings: No rash.   Neurological:      General: No focal deficit present.      Mental Status: He is alert and oriented to person, place, and time.   Psychiatric:         Mood and Affect: Mood normal.         Thought Content: Thought content normal.         Judgment: Judgment normal.         Procedures    No visits with results within 30 Day(s) from this visit.   Latest known visit with results is:   Office Visit on 11/19/2021   Component Date Value Ref Range Status   • Glucose 11/19/2021 84  65 - 99 mg/dL Final   • BUN 11/19/2021 14  6 - 20 mg/dL Final   • Creatinine 11/19/2021 1.00  0.76 - 1.27 mg/dL Final   • Sodium 11/19/2021 139  136 - 145 mmol/L Final   • Potassium 11/19/2021 4.1  3.5 - 5.2 mmol/L Final   • Chloride 11/19/2021 104  98 - 107 mmol/L Final   • CO2 11/19/2021 27.6  22.0 - 29.0 mmol/L Final   • Calcium 11/19/2021 9.5  8.6 - 10.5 mg/dL Final   • Total Protein 11/19/2021 7.0  6.0 - 8.5 g/dL Final   • Albumin 11/19/2021 4.20  3.50 - 5.20 g/dL Final   • ALT (SGPT) 11/19/2021 24  1 - 41 U/L Final   • AST (SGOT) 11/19/2021 15  1 - 40 U/L Final   • Alkaline Phosphatase 11/19/2021 108  39 - 117 U/L Final   • Total Bilirubin 11/19/2021 0.3  0.0 - 1.2 mg/dL Final   • eGFR Non African Amer 11/19/2021 78  >60 mL/min/1.73 Final   • Globulin 11/19/2021 2.8  gm/dL Final   • A/G Ratio 11/19/2021 1.5  g/dL Final   • BUN/Creatinine Ratio 11/19/2021 14.0  7.0 - 25.0 Final   • Anion Gap 11/19/2021 7.4  5.0 - 15.0 mmol/L Final   • Total Cholesterol 11/19/2021 235 (A) 0 - 200 mg/dL Final   • Triglycerides 11/19/2021 489 (A) 0 - 150 mg/dL Final   • HDL Cholesterol 11/19/2021 25 (A) 40 - 60 mg/dL Final   • LDL Cholesterol  11/19/2021 122 (A) 0 - 100 mg/dL Final   • VLDL Cholesterol 11/19/2021 88 (A) 5 - 40 mg/dL Final   • LDL/HDL Ratio 11/19/2021 4.49   Final       ASSESSMENT/ PLAN:    Diagnoses and all orders for this visit:    1. Essential hypertension (Primary)  Assessment & Plan:  His blood pressure is  good here today and it has been good at home.  Did caution about using any pseudoephedrine products as it can raise his blood pressure.  We will update his labs and continue his current meds.        Orders Placed Today:     No orders of the defined types were placed in this encounter.       Management Plan:     An After Visit Summary was printed and given to the patient at discharge.    Follow-up: Return in about 6 months (around 11/20/2022).    Andrade Johnson,  5/20/2022 16:41 EDT  This note was electronically signed.  Answers for HPI/ROS submitted by the patient on 5/13/2022  What is the primary reason for your visit?: High Blood Pressure

## 2022-05-21 DIAGNOSIS — E78.1 HYPERTRIGLYCERIDEMIA: Primary | ICD-10-CM

## 2022-05-21 RX ORDER — FENOFIBRATE 145 MG/1
145 TABLET, COATED ORAL DAILY
Qty: 90 TABLET | Refills: 0 | Status: SHIPPED | OUTPATIENT
Start: 2022-05-21 | End: 2022-08-05

## 2022-05-27 ENCOUNTER — TELEPHONE (OUTPATIENT)
Dept: FAMILY MEDICINE CLINIC | Facility: CLINIC | Age: 52
End: 2022-05-27

## 2022-05-27 NOTE — TELEPHONE ENCOUNTER
Spoke with patient, stated he has had the side pain for about a week and it is causing him to vomit. Stated the pain is related to when he previously had kidney stones. The blood in his urine has been there for almost a week now.

## 2022-05-27 NOTE — TELEPHONE ENCOUNTER
Caller: Paul Almendarez    Relationship: Self    Best call back number: [phone]    What is the best time to reach you: ANY    Who are you requesting to speak with (clinical staff, provider,  specific staff member): CLINICAL    What was the call regarding: PATIENT IS HAVING SIDE PAIN NEAR KIDNEY AREA AND BLOOD IN URINE. PATIENT IS NOT SURE WHAT TO DO AND WOULD LIKE TO SPEAK TO A NURSE.     Do you require a callback: YES

## 2022-05-27 NOTE — TELEPHONE ENCOUNTER
Given his current symptoms he may have a recurrent kidney stone.  Without further evaluation is difficult to say.  Typically if he is not having excruciating pain he needs to vigorously hydrate.  If his pain is severe enough he would need to go to the emergency room before we can see him next week.

## 2022-06-01 ENCOUNTER — OFFICE VISIT (OUTPATIENT)
Dept: FAMILY MEDICINE CLINIC | Facility: CLINIC | Age: 52
End: 2022-06-01

## 2022-06-01 VITALS
HEART RATE: 105 BPM | WEIGHT: 245 LBS | DIASTOLIC BLOOD PRESSURE: 88 MMHG | BODY MASS INDEX: 36.29 KG/M2 | TEMPERATURE: 98.6 F | SYSTOLIC BLOOD PRESSURE: 146 MMHG | OXYGEN SATURATION: 94 % | HEIGHT: 69 IN

## 2022-06-01 DIAGNOSIS — R31.0 GROSS HEMATURIA: Primary | ICD-10-CM

## 2022-06-01 DIAGNOSIS — N20.0 NEPHROLITHIASIS: ICD-10-CM

## 2022-06-01 LAB
BILIRUB BLD-MCNC: NEGATIVE MG/DL
CLARITY, POC: CLEAR
COLOR UR: YELLOW
EXPIRATION DATE: ABNORMAL
GLUCOSE UR STRIP-MCNC: NEGATIVE MG/DL
KETONES UR QL: NEGATIVE
LEUKOCYTE EST, POC: NEGATIVE
Lab: ABNORMAL
NITRITE UR-MCNC: NEGATIVE MG/ML
PH UR: 7 [PH] (ref 5–8)
PROT UR STRIP-MCNC: NEGATIVE MG/DL
RBC # UR STRIP: ABNORMAL /UL
SP GR UR: 1.02 (ref 1–1.03)
UROBILINOGEN UR QL: NORMAL

## 2022-06-01 PROCEDURE — 99213 OFFICE O/P EST LOW 20 MIN: CPT | Performed by: FAMILY MEDICINE

## 2022-06-01 PROCEDURE — 81003 URINALYSIS AUTO W/O SCOPE: CPT | Performed by: FAMILY MEDICINE

## 2022-06-01 NOTE — PROGRESS NOTES
Chief Complaint   Patient presents with   • Abdominal Pain        Subjective     Paul Almendarez  has a past medical history of Kidney stone.    Abdominal Pain  This is a recurrent problem. The current episode started 1 to 4 weeks ago. The onset quality is sudden. The problem occurs intermittently. The most recent episode lasted 2 hours. The problem has been gradually improving. The pain is located in the left flank. The pain is at a severity of 0/10. The quality of the pain is aching. The abdominal pain does not radiate. Associated symptoms include hematuria. Pertinent negatives include no anorexia, arthralgias, belching, constipation, diarrhea, dysuria, fever, flatus, frequency, headaches, hematochezia, melena, myalgias, nausea, vomiting or weight loss. The pain is aggravated by movement. The pain is relieved by being still.       PHQ-2 Depression Screening  Little interest or pleasure in doing things?     Feeling down, depressed, or hopeless?     PHQ-2 Total Score     PHQ-9 Depression Screening  Little interest or pleasure in doing things?     Feeling down, depressed, or hopeless?     Trouble falling or staying asleep, or sleeping too much?     Feeling tired or having little energy?     Poor appetite or overeating?     Feeling bad about yourself - or that you are a failure or have let yourself or your family down?     Trouble concentrating on things, such as reading the newspaper or watching television?     Moving or speaking so slowly that other people could have noticed? Or the opposite - being so fidgety or restless that you have been moving around a lot more than usual?     Thoughts that you would be better off dead, or of hurting yourself in some way?     PHQ-9 Total Score     If you checked off any problems, how difficult have these problems made it for you to do your work, take care of things at home, or get along with other people?       Allergies   Allergen Reactions   • Iodine Hives, Shortness Of  Breath and Swelling       Prior to Admission medications    Medication Sig Start Date End Date Taking? Authorizing Provider   fenofibrate (Tricor) 145 MG tablet Take 1 tablet by mouth Daily. 22  Yes Andrade Johnson DO   olmesartan (Benicar) 20 MG tablet Take 1 tablet by mouth Daily for 90 days. 11/5/21 2/3/22 Yes Andrade Johnson DO   Phenylephrine-Ibuprofen (Sudafed PE Head Congestion)  MG tablet Take  by mouth.  22  Provider, MD Orly        Patient Active Problem List   Diagnosis   • Ureteral stone   • Kidney stone   • Hospital discharge follow-up   • Essential hypertension   • Nephrolithiasis   • Hypertriglyceridemia   • Gross hematuria        Past Surgical History:   Procedure Laterality Date   • CYSTOSCOPY URETEROSCOPY Right 2021    Procedure: CYSTOSCOPY, RIGHT URETEROSCOPY, LASERTRIPSY, STONE BASKET EXTRACTION AND STENT EXCHANGE;  Surgeon: Gwyn Azul MD;  Location: [facility];  Service: Urology;  Laterality: Right;   • KIDNEY STONE SURGERY     • URETEROSCOPY LASER LITHOTRIPSY WITH STENT INSERTION Bilateral 2021    Procedure: CYSTOSCOPY, BILATERAL URETEROSCOPY  AND  LEFT LASER LITHOTRIPSY WITH STONE REMOVAL ,  RIGHT RETROGRADE AND RIGHT URETERAL  STENT INSERTION;  Surgeon: Andre Majano MD;  Location: [facility];  Service: Urology;  Laterality: Bilateral;       Social History     Socioeconomic History   • Marital status:    Tobacco Use   • Smoking status: Former Smoker     Types: Cigarettes     Quit date: 2016     Years since quittin.4   • Smokeless tobacco: Never Used   Vaping Use   • Vaping Use: Never used   Substance and Sexual Activity   • Alcohol use: Yes     Comment: occasionally   • Drug use: Never   • Sexual activity: Defer       History reviewed. No pertinent family history.    Family history, surgical history, past medical history, Allergies and med's reviewed with patient today and updated in [facility].     ROS:  Review of  "Systems   Constitutional: Negative for fever and unexpected weight loss.   Gastrointestinal: Positive for abdominal pain. Negative for anorexia, constipation, diarrhea, flatus, hematochezia, melena, nausea and vomiting.   Genitourinary: Positive for hematuria. Negative for dysuria and frequency.   Musculoskeletal: Negative for arthralgias and myalgias.       OBJECTIVE:  Vitals:    06/01/22 0954   BP: 146/88   BP Location: Right arm   Patient Position: Sitting   Pulse: 105   Temp: 98.6 °F (37 °C)   SpO2: 94%   Weight: 111 kg (245 lb)   Height: 175.3 cm (69\")     No exam data present   Body mass index is 36.18 kg/m².  No LMP for male patient.    Physical Exam  Vitals and nursing note reviewed.   Constitutional:       General: He is not in acute distress.     Appearance: Normal appearance. He is normal weight.   HENT:      Head: Normocephalic.   Cardiovascular:      Rate and Rhythm: Normal rate and regular rhythm.      Heart sounds: Normal heart sounds. No murmur heard.  Pulmonary:      Effort: Pulmonary effort is normal.      Breath sounds: Normal breath sounds. No wheezing, rhonchi or rales.   Abdominal:      General: Abdomen is flat. Bowel sounds are normal.      Palpations: Abdomen is soft.      Tenderness: There is abdominal tenderness (left anterior flank).   Neurological:      Mental Status: He is alert.         Procedures    Office Visit on 06/01/2022   Component Date Value Ref Range Status   • Color 06/01/2022 Yellow  Yellow, Straw, Dark Yellow, Yadira Final   • Clarity, UA 06/01/2022 Clear  Clear Final   • Specific Gravity  06/01/2022 1.020  1.005 - 1.030 Final   • pH, Urine 06/01/2022 7.0  5.0 - 8.0 Final   • Leukocytes 06/01/2022 Negative  Negative Final   • Nitrite, UA 06/01/2022 Negative  Negative Final   • Protein, POC 06/01/2022 Negative  Negative mg/dL Final   • Glucose, UA 06/01/2022 Negative  Negative, 1000 mg/dL (3+) mg/dL Final   • Ketones, UA 06/01/2022 Negative  Negative Final   • Urobilinogen, UA " 06/01/2022 Normal  Normal Final   • Bilirubin 06/01/2022 Negative  Negative Final   • Blood, UA 06/01/2022 Moderate (A) Negative Final   • Lot Number 06/01/2022 [id number]   Final   • Expiration Date 06/01/2022 09/01/2022   Final   Office Visit on 05/20/2022   Component Date Value Ref Range Status   • Glucose 05/20/2022 99  65 - 99 mg/dL Final   • BUN 05/20/2022 15  6 - 20 mg/dL Final   • Creatinine 05/20/2022 1.13  0.76 - 1.27 mg/dL Final   • Sodium 05/20/2022 140  136 - 145 mmol/L Final   • Potassium 05/20/2022 4.5  3.5 - 5.2 mmol/L Final    Slight hemolysis detected by analyzer. Results may be affected.   • Chloride 05/20/2022 102  98 - 107 mmol/L Final   • CO2 05/20/2022 26.6  22.0 - 29.0 mmol/L Final   • Calcium 05/20/2022 10.0  8.6 - 10.5 mg/dL Final   • Total Protein 05/20/2022 7.8  6.0 - 8.5 g/dL Final   • Albumin 05/20/2022 4.30  3.50 - 5.20 g/dL Final   • ALT (SGPT) 05/20/2022 34  1 - 41 U/L Final   • AST (SGOT) 05/20/2022 21  1 - 40 U/L Final    Slight hemolysis detected by analyzer. Results may be affected.   • Alkaline Phosphatase 05/20/2022 116  39 - 117 U/L Final   • Total Bilirubin 05/20/2022 0.2  0.0 - 1.2 mg/dL Final   • Globulin 05/20/2022 3.5  gm/dL Final   • A/G Ratio 05/20/2022 1.2  g/dL Final   • BUN/Creatinine Ratio 05/20/2022 13.3  7.0 - 25.0 Final   • Anion Gap 05/20/2022 11.4  5.0 - 15.0 mmol/L Final   • eGFR 05/20/2022 78.2  >60.0 mL/min/1.73 Final    National Kidney Foundation and American Society of Nephrology (ASN) Task Force recommended calculation based on the Chronic Kidney Disease Epidemiology Collaboration (CKD-EPI) equation refit without adjustment for race.   • Total Cholesterol 05/20/2022 252 (A) 0 - 200 mg/dL Final   • Triglycerides 05/20/2022 1,171 (A) 0 - 150 mg/dL Final   • HDL Cholesterol 05/20/2022 23 (A) 40 - 60 mg/dL Final   • LDL Cholesterol  05/20/2022    Final    Unable to calculate   • VLDL Cholesterol 05/20/2022    Final    Unable to calculate   • LDL/HDL Ratio  05/20/2022    Final    Unable to calculate   • Uric Acid 05/20/2022 6.0  3.4 - 7.0 mg/dL Final   • LDL Cholesterol  05/20/2022 116 (A) 0 - 100 mg/dL Final       ASSESSMENT/ PLAN:    Diagnoses and all orders for this visit:    1. Gross hematuria (Primary)  Assessment & Plan:  His urinalysis today still shows moderate blood.  With his history of kidney stones we will get a CT to rule out stone.  In the meantime he will vigorously rehydrate.    Orders:  -     POCT urinalysis dipstick, automated  -     CT Abdomen Pelvis Stone Protocol; Future    2. Nephrolithiasis  -     CT Abdomen Pelvis Stone Protocol; Future      Orders Placed Today:     No orders of the defined types were placed in this encounter.       Management Plan:     An After Visit Summary was printed and given to the patient at discharge.    Follow-up: Return if symptoms worsen or fail to improve.    Andrade Johnson DO 6/1/2022 10:31 EDT  This note was electronically signed.  Answers for HPI/ROS submitted by the patient on 6/1/2022  What is the primary reason for your visit?: Abdominal Pain

## 2022-06-01 NOTE — ASSESSMENT & PLAN NOTE
His urinalysis today still shows moderate blood.  With his history of kidney stones we will get a CT to rule out stone.  In the meantime he will vigorously rehydrate.

## 2022-06-02 ENCOUNTER — TELEPHONE (OUTPATIENT)
Dept: FAMILY MEDICINE CLINIC | Facility: CLINIC | Age: 52
End: 2022-06-02

## 2022-06-02 NOTE — TELEPHONE ENCOUNTER
Caller: Paul Almendarez    Relationship: Self    Best call back number: [phone]     What is the best time to reach you: ANY     Who are you requesting to speak with (clinical staff, provider,  specific staff member): CLINICAL     What was the call regarding: PATIENT STATED THAT HE WAS TOLD TO GIVE OFFICE A CALL IF HIS CT SCAN WAS NOT SCHEDULED ASAP PATIENT APPOINTMENT IS THREE WEEKS OUT FOR 06/27 SO HE IS CALLING INTO SEE IF PCP CAN GET IT DONE SOONER.    Do you require a callback: YES

## 2022-06-03 ENCOUNTER — HOSPITAL ENCOUNTER (OUTPATIENT)
Dept: CT IMAGING | Facility: HOSPITAL | Age: 52
Discharge: HOME | End: 2022-06-03
Admitting: FAMILY MEDICINE

## 2022-06-03 DIAGNOSIS — R31.0 GROSS HEMATURIA: ICD-10-CM

## 2022-06-03 DIAGNOSIS — N20.0 NEPHROLITHIASIS: ICD-10-CM

## 2022-06-03 PROCEDURE — 74176 CT ABD & PELVIS W/O CONTRAST: CPT

## 2022-06-06 DIAGNOSIS — R31.0 GROSS HEMATURIA: Primary | ICD-10-CM

## 2022-06-07 ENCOUNTER — PREP FOR SURGERY (OUTPATIENT)
Dept: OTHER | Facility: HOSPITAL | Age: 52
End: 2022-06-07

## 2022-06-07 ENCOUNTER — OFFICE VISIT (OUTPATIENT)
Dept: UROLOGY | Facility: CLINIC | Age: 52
End: 2022-06-07

## 2022-06-07 VITALS — BODY MASS INDEX: 36.61 KG/M2 | HEIGHT: 69 IN | RESPIRATION RATE: 16 BRPM | WEIGHT: 247.2 LBS

## 2022-06-07 DIAGNOSIS — N20.1 URETERAL STONE: Primary | ICD-10-CM

## 2022-06-07 DIAGNOSIS — N20.0 NEPHROLITHIASIS: Primary | ICD-10-CM

## 2022-06-07 PROCEDURE — 99213 OFFICE O/P EST LOW 20 MIN: CPT | Performed by: UROLOGY

## 2022-06-07 RX ORDER — SODIUM CHLORIDE 0.9 % (FLUSH) 0.9 %
10 SYRINGE (ML) INJECTION AS NEEDED
Status: CANCELLED | OUTPATIENT
Start: 2022-06-07

## 2022-06-07 RX ORDER — SODIUM CHLORIDE 9 MG/ML
100 INJECTION, SOLUTION INTRAVENOUS CONTINUOUS
Status: CANCELLED | OUTPATIENT
Start: 2022-06-07

## 2022-06-07 RX ORDER — LEVOFLOXACIN 5 MG/ML
500 INJECTION, SOLUTION INTRAVENOUS ONCE
Status: CANCELLED | OUTPATIENT
Start: 2022-06-07 | End: 2022-06-07

## 2022-06-07 RX ORDER — SODIUM CHLORIDE 0.9 % (FLUSH) 0.9 %
3 SYRINGE (ML) INJECTION EVERY 12 HOURS SCHEDULED
Status: CANCELLED | OUTPATIENT
Start: 2022-06-07

## 2022-06-07 NOTE — H&P
[facility]   UROLOGY HISTORY AND PHYSICAL    Patient Name: aPul Almendarez  : 1970  MRN: [id number]  Primary Care Physician:  Andrade Johnson DO  Date of admission: (Not on file)    Subjective   Subjective     Chief Complaint: left ureteral stone    HPI:    Paul Almendarez is a 51 y.o. male left ureteral stone      Review of Systems     10 systems reviewed and are negative other than what is listed in HPI    Personal History     Past Medical History:   Diagnosis Date   • Kidney stone        Past Surgical History:   Procedure Laterality Date   • CYSTOSCOPY URETEROSCOPY Right 2021    Procedure: CYSTOSCOPY, RIGHT URETEROSCOPY, LASERTRIPSY, STONE BASKET EXTRACTION AND STENT EXCHANGE;  Surgeon: Gwyn Azul MD;  Location: [facility];  Service: Urology;  Laterality: Right;   • KIDNEY STONE SURGERY     • URETEROSCOPY LASER LITHOTRIPSY WITH STENT INSERTION Bilateral 2021    Procedure: CYSTOSCOPY, BILATERAL URETEROSCOPY  AND  LEFT LASER LITHOTRIPSY WITH STONE REMOVAL ,  RIGHT RETROGRADE AND RIGHT URETERAL  STENT INSERTION;  Surgeon: Andre Majano MD;  Location: [facility];  Service: Urology;  Laterality: Bilateral;       Family History: family history is not on file. Otherwise pertinent FHx was reviewed and not pertinent to current issue.    Social History:  reports that he quit smoking about 6 years ago. His smoking use included cigarettes. He has never used smokeless tobacco. He reports current alcohol use. He reports that he does not use drugs.    Home Medications:  fenofibrate and olmesartan      Allergies:  Allergies   Allergen Reactions   • Iodine Hives, Shortness Of Breath and Swelling       Objective   Objective     Vitals:   Resp:  [16] 16  Physical Exam    Constitutional: Awake, alert    Respiratory: Clear to auscultation bilaterally, nonlabored respirations    Cardiovascular: RRR, no murmurs, rubs, or gallops, palpable pedal pulses  bilaterally   Gastrointestinal: Positive bowel sounds, soft, nontender, nondistended    Skin: No rashes     Result Review    Result Review:  I have personally reviewed the results from the time of this admission to 6/7/2022 15:41 EDT and agree with these findings:  []  Laboratory  []  Microbiology  []  Radiology  []  EKG/Telemetry   []  Cardiology/Vascular   []  Pathology  []  Old records  []  Other:    Assessment & Plan   Assessment / Plan     Brief Patient Summary:  Paul Almendarez is a 51 y.o. male     Active Hospital Problems:  There are no active hospital problems to display for this patient.    Left ureteral stone    Plan:   Cystoscopy with left ureteroscopy with laser and left ureteral stent placement.  Risks and benefits were discussed including bleeding, infection and damage to the urinary system.  We also discussed the risk of anesthesia up to and including death.  Patient voiced understanding and would like to proceed.    Electronically signed by Gwyn Azul MD, 06/07/22, 3:41 PM EDT.

## 2022-06-07 NOTE — H&P (VIEW-ONLY)
[facility]   UROLOGY HISTORY AND PHYSICAL    Patient Name: Paul Almendarez  : 1970  MRN: [id number]  Primary Care Physician:  Andrade Johnson DO  Date of admission: (Not on file)    Subjective   Subjective     Chief Complaint: left ureteral stone    HPI:    Paul Almendarez is a 51 y.o. male left ureteral stone      Review of Systems     10 systems reviewed and are negative other than what is listed in HPI    Personal History     Past Medical History:   Diagnosis Date   • Kidney stone        Past Surgical History:   Procedure Laterality Date   • CYSTOSCOPY URETEROSCOPY Right 2021    Procedure: CYSTOSCOPY, RIGHT URETEROSCOPY, LASERTRIPSY, STONE BASKET EXTRACTION AND STENT EXCHANGE;  Surgeon: Gwyn Azul MD;  Location: [facility];  Service: Urology;  Laterality: Right;   • KIDNEY STONE SURGERY     • URETEROSCOPY LASER LITHOTRIPSY WITH STENT INSERTION Bilateral 2021    Procedure: CYSTOSCOPY, BILATERAL URETEROSCOPY  AND  LEFT LASER LITHOTRIPSY WITH STONE REMOVAL ,  RIGHT RETROGRADE AND RIGHT URETERAL  STENT INSERTION;  Surgeon: Andre Majano MD;  Location: [facility];  Service: Urology;  Laterality: Bilateral;       Family History: family history is not on file. Otherwise pertinent FHx was reviewed and not pertinent to current issue.    Social History:  reports that he quit smoking about 6 years ago. His smoking use included cigarettes. He has never used smokeless tobacco. He reports current alcohol use. He reports that he does not use drugs.    Home Medications:  fenofibrate and olmesartan      Allergies:  Allergies   Allergen Reactions   • Iodine Hives, Shortness Of Breath and Swelling       Objective   Objective     Vitals:   Resp:  [16] 16  Physical Exam    Constitutional: Awake, alert    Respiratory: Clear to auscultation bilaterally, nonlabored respirations    Cardiovascular: RRR, no murmurs, rubs, or gallops, palpable pedal pulses  bilaterally   Gastrointestinal: Positive bowel sounds, soft, nontender, nondistended    Skin: No rashes     Result Review    Result Review:  I have personally reviewed the results from the time of this admission to 6/7/2022 15:41 EDT and agree with these findings:  []  Laboratory  []  Microbiology  []  Radiology  []  EKG/Telemetry   []  Cardiology/Vascular   []  Pathology  []  Old records  []  Other:    Assessment & Plan   Assessment / Plan     Brief Patient Summary:  Paul Almendarez is a 51 y.o. male     Active Hospital Problems:  There are no active hospital problems to display for this patient.    Left ureteral stone    Plan:   Cystoscopy with left ureteroscopy with laser and left ureteral stent placement.  Risks and benefits were discussed including bleeding, infection and damage to the urinary system.  We also discussed the risk of anesthesia up to and including death.  Patient voiced understanding and would like to proceed.    Electronically signed by Gwyn Azul MD, 06/07/22, 3:41 PM EDT.

## 2022-06-07 NOTE — PROGRESS NOTES
Chief Complaint    Urologic complaint    Subjective          Paul Almendarez presents to [facility] UROLOGY  History of Present Illness    50-year-old gentleman     Nephrolithiasis  Ureteral stone      Intermittent pain in his left flank.    No fevers or chills.       6/3/22 CT abdomen/pelvis without -5 x 6 x 9 mm proximal left ureter.  2 -  3 mm stones in the left kidney, punctate stones in the right kidney.  Images reviewed  6/1/2022 UA-moderate blood, negative otherwise  5/22 1.1, GFR >60     No cardiopulmonary history, non-smoker, no anticoagulation    Previous    9/23/2021 right ureteroscopy with laser stent with no string -2.5 cm prostate.  1.8 cm right ureteral stone.  Removed in its entirety no other stones  10/21  Ca Oxalate - Monohydrate 40, calcium oxalate dihydrate 30, hydroxyapatite 30    Right Stent removed in the office    Has never been on medication for kidney stone prevention.      9/4/2021 bilateral ureteroscopy, left stone removal, right stent placement - Shafran -no stent placed on the left, no mention of removal of nonobstructing left renal stones in the operative note.  Unable to identify the right stone because of a tortuous ureter, right stent was placed    9/4/2021 CT-abd/pelvis without -left kidney with a 5 mm mid ureteral stone, 3 nonobstructing stones in the left kidney 5 mm, 4 mm and 2 mm.  Right kidney has a 2 mm stone in the kidney nonobstructing and a 1.4 x 1.2 proximal ureteral stone    No cardiopulmonary history.  Takes naproxen    9/21 creatinine 1.1, GFR 68     6[place]14[place]2019 [place] 2 left ureteral stones one is about 4 mm and one is about 7 mm.  Also a 3 mm nonobstructing left renal stone.  7 mm right nonobstructing renal stone.    he has passed through for kidney stones, he said lithotripsy x2        Results for orders placed or performed in visit on 06/01/22   POCT urinalysis dipstick, automated    Specimen: Urine    Result Value Ref Range    Color Yellow Yellow, Straw, Dark Yellow, Yadira    Clarity, UA Clear Clear    Specific Gravity  1.020 1.005 - 1.030    pH, Urine 7.0 5.0 - 8.0    Leukocytes Negative Negative    Nitrite, UA Negative Negative    Protein, POC Negative Negative mg/dL    Glucose, UA Negative Negative, 1000 mg/dL (3+) mg/dL    Ketones, UA Negative Negative    Urobilinogen, UA Normal Normal    Bilirubin Negative Negative    Blood, UA Moderate (A) Negative    Lot Number [id number]     Expiration Date 09/01/2022          Past History:  Medical History: has a past medical history of Kidney stone.   Surgical History: has a past surgical history that includes Kidney stone surgery; ureteroscopy laser lithotripsy with stent insertion (Bilateral, 9/4/2021); and cystoscopy ureteroscopy (Right, 9/23/2021).   Family History: family history is not on file.   Social History: reports that he quit smoking about 6 years ago. His smoking use included cigarettes. He has never used smokeless tobacco. He reports current alcohol use. He reports that he does not use drugs.  Allergies: Iodine       Current Outpatient Medications:   •  fenofibrate (Tricor) 145 MG tablet, Take 1 tablet by mouth Daily., Disp: 90 tablet, Rfl: 0  •  olmesartan (Benicar) 20 MG tablet, Take 1 tablet by mouth Daily for 90 days., Disp: 90 tablet, Rfl: 1     Physical exam       Alert and orient x3  Well appearing, well developed, in no acute distress   Unlabored respirations    Grossly oriented to person, place and time, judgment is intact, normal mood and affect         Objective     Vital Signs:   There were no vitals taken for this visit.             Assessment and Plan    Diagnoses and all orders for this visit:    1. Nephrolithiasis (Primary)          Nephrolithiasis    CT images and read reviewed and discussed with the patient  Records reviewed and summarized in chart    We will plan on cystoscopy with left ureteroscopy with laser and left renal stent  placement.  Risks and benefits were discussed including bleeding, infection and damage to the urinary system.  We also discussed the risk of anesthesia up to and including death.  Patient voiced understanding and would like to proceed.

## 2022-06-08 ENCOUNTER — APPOINTMENT (OUTPATIENT)
Dept: GENERAL RADIOLOGY | Facility: HOSPITAL | Age: 52
End: 2022-06-08

## 2022-06-08 ENCOUNTER — HOSPITAL ENCOUNTER (OUTPATIENT)
Facility: HOSPITAL | Age: 52
Discharge: HOME | End: 2022-06-08
Attending: UROLOGY | Admitting: UROLOGY

## 2022-06-08 ENCOUNTER — ANESTHESIA (OUTPATIENT)
Dept: PERIOP | Facility: HOSPITAL | Age: 52
End: 2022-06-08

## 2022-06-08 ENCOUNTER — ANESTHESIA EVENT (OUTPATIENT)
Dept: PERIOP | Facility: HOSPITAL | Age: 52
End: 2022-06-08

## 2022-06-08 VITALS
WEIGHT: 243.39 LBS | BODY MASS INDEX: 36.05 KG/M2 | DIASTOLIC BLOOD PRESSURE: 92 MMHG | HEART RATE: 93 BPM | SYSTOLIC BLOOD PRESSURE: 124 MMHG | RESPIRATION RATE: 18 BRPM | OXYGEN SATURATION: 98 % | HEIGHT: 69 IN | TEMPERATURE: 97.6 F

## 2022-06-08 DIAGNOSIS — N20.1 URETERAL STONE: ICD-10-CM

## 2022-06-08 PROCEDURE — 25010000002 DEXAMETHASONE PER 1 MG: Performed by: NURSE ANESTHETIST, CERTIFIED REGISTERED

## 2022-06-08 PROCEDURE — 52356 CYSTO/URETERO W/LITHOTRIPSY: CPT | Performed by: UROLOGY

## 2022-06-08 PROCEDURE — C1769 GUIDE WIRE: HCPCS | Performed by: UROLOGY

## 2022-06-08 PROCEDURE — 25010000002 ONDANSETRON PER 1 MG: Performed by: NURSE ANESTHETIST, CERTIFIED REGISTERED

## 2022-06-08 PROCEDURE — 74018 RADEX ABDOMEN 1 VIEW: CPT

## 2022-06-08 PROCEDURE — 25010000002 LEVOFLOXACIN PER 250 MG: Performed by: UROLOGY

## 2022-06-08 PROCEDURE — 25010000002 KETOROLAC TROMETHAMINE PER 15 MG: Performed by: NURSE ANESTHETIST, CERTIFIED REGISTERED

## 2022-06-08 PROCEDURE — C2617 STENT, NON-COR, TEM W/O DEL: HCPCS | Performed by: UROLOGY

## 2022-06-08 PROCEDURE — 25010000002 FENTANYL CITRATE (PF) 50 MCG/ML SOLUTION: Performed by: NURSE ANESTHETIST, CERTIFIED REGISTERED

## 2022-06-08 PROCEDURE — C1894 INTRO/SHEATH, NON-LASER: HCPCS | Performed by: UROLOGY

## 2022-06-08 PROCEDURE — 82365 CALCULUS SPECTROSCOPY: CPT | Performed by: UROLOGY

## 2022-06-08 PROCEDURE — 76000 FLUOROSCOPY <1 HR PHYS/QHP: CPT

## 2022-06-08 PROCEDURE — 25010000002 PROPOFOL 10 MG/ML EMULSION: Performed by: NURSE ANESTHETIST, CERTIFIED REGISTERED

## 2022-06-08 PROCEDURE — 88300 SURGICAL PATH GROSS: CPT | Performed by: UROLOGY

## 2022-06-08 PROCEDURE — 25010000002 MIDAZOLAM PER 1 MG: Performed by: ANESTHESIOLOGY

## 2022-06-08 DEVICE — STNT URETRL CLASSC DBL PIG 6F 26CM: Type: IMPLANTABLE DEVICE | Site: URETER | Status: FUNCTIONAL

## 2022-06-08 RX ORDER — LEVOFLOXACIN 5 MG/ML
500 INJECTION, SOLUTION INTRAVENOUS ONCE
Status: COMPLETED | OUTPATIENT
Start: 2022-06-08 | End: 2022-06-08

## 2022-06-08 RX ORDER — SODIUM CHLORIDE, SODIUM LACTATE, POTASSIUM CHLORIDE, CALCIUM CHLORIDE 600; 310; 30; 20 MG/100ML; MG/100ML; MG/100ML; MG/100ML
9 INJECTION, SOLUTION INTRAVENOUS CONTINUOUS PRN
Status: DISCONTINUED | OUTPATIENT
Start: 2022-06-08 | End: 2022-06-08 | Stop reason: HOSPADM

## 2022-06-08 RX ORDER — DEXAMETHASONE SODIUM PHOSPHATE 4 MG/ML
INJECTION, SOLUTION INTRA-ARTICULAR; INTRALESIONAL; INTRAMUSCULAR; INTRAVENOUS; SOFT TISSUE AS NEEDED
Status: DISCONTINUED | OUTPATIENT
Start: 2022-06-08 | End: 2022-06-08 | Stop reason: SURG

## 2022-06-08 RX ORDER — FENTANYL CITRATE 50 UG/ML
INJECTION, SOLUTION INTRAMUSCULAR; INTRAVENOUS AS NEEDED
Status: DISCONTINUED | OUTPATIENT
Start: 2022-06-08 | End: 2022-06-08 | Stop reason: SURG

## 2022-06-08 RX ORDER — KETOROLAC TROMETHAMINE 30 MG/ML
INJECTION, SOLUTION INTRAMUSCULAR; INTRAVENOUS AS NEEDED
Status: DISCONTINUED | OUTPATIENT
Start: 2022-06-08 | End: 2022-06-08 | Stop reason: SURG

## 2022-06-08 RX ORDER — OXYCODONE HYDROCHLORIDE 5 MG/1
5 TABLET ORAL
Status: DISCONTINUED | OUTPATIENT
Start: 2022-06-08 | End: 2022-06-08 | Stop reason: HOSPADM

## 2022-06-08 RX ORDER — MEPERIDINE HYDROCHLORIDE 25 MG/ML
12.5 INJECTION INTRAMUSCULAR; INTRAVENOUS; SUBCUTANEOUS
Status: DISCONTINUED | OUTPATIENT
Start: 2022-06-08 | End: 2022-06-08 | Stop reason: HOSPADM

## 2022-06-08 RX ORDER — HYDROCODONE BITARTRATE AND ACETAMINOPHEN 7.5; 325 MG/1; MG/1
1 TABLET ORAL EVERY 6 HOURS PRN
Qty: 15 TABLET | Refills: 0 | Status: SHIPPED | OUTPATIENT
Start: 2022-06-08 | End: 2022-07-26

## 2022-06-08 RX ORDER — PROMETHAZINE HYDROCHLORIDE 12.5 MG/1
12.5 TABLET ORAL ONCE AS NEEDED
Status: DISCONTINUED | OUTPATIENT
Start: 2022-06-08 | End: 2022-06-08 | Stop reason: HOSPADM

## 2022-06-08 RX ORDER — ROCURONIUM BROMIDE 10 MG/ML
INJECTION, SOLUTION INTRAVENOUS AS NEEDED
Status: DISCONTINUED | OUTPATIENT
Start: 2022-06-08 | End: 2022-06-08 | Stop reason: SURG

## 2022-06-08 RX ORDER — PROMETHAZINE HYDROCHLORIDE 25 MG/1
25 SUPPOSITORY RECTAL ONCE AS NEEDED
Status: DISCONTINUED | OUTPATIENT
Start: 2022-06-08 | End: 2022-06-08 | Stop reason: HOSPADM

## 2022-06-08 RX ORDER — SODIUM CHLORIDE 0.9 % (FLUSH) 0.9 %
10 SYRINGE (ML) INJECTION AS NEEDED
Status: DISCONTINUED | OUTPATIENT
Start: 2022-06-08 | End: 2022-06-08 | Stop reason: HOSPADM

## 2022-06-08 RX ORDER — SODIUM CHLORIDE 0.9 % (FLUSH) 0.9 %
3 SYRINGE (ML) INJECTION EVERY 12 HOURS SCHEDULED
Status: DISCONTINUED | OUTPATIENT
Start: 2022-06-08 | End: 2022-06-08 | Stop reason: HOSPADM

## 2022-06-08 RX ORDER — ONDANSETRON 4 MG/1
4 TABLET, FILM COATED ORAL ONCE AS NEEDED
Status: DISCONTINUED | OUTPATIENT
Start: 2022-06-08 | End: 2022-06-08 | Stop reason: HOSPADM

## 2022-06-08 RX ORDER — SODIUM CHLORIDE 9 MG/ML
100 INJECTION, SOLUTION INTRAVENOUS CONTINUOUS
Status: DISCONTINUED | OUTPATIENT
Start: 2022-06-08 | End: 2022-06-08 | Stop reason: HOSPADM

## 2022-06-08 RX ORDER — ACETAMINOPHEN 500 MG
1000 TABLET ORAL ONCE
Status: COMPLETED | OUTPATIENT
Start: 2022-06-08 | End: 2022-06-08

## 2022-06-08 RX ORDER — ACETAMINOPHEN 325 MG/1
650 TABLET ORAL ONCE
Status: DISCONTINUED | OUTPATIENT
Start: 2022-06-08 | End: 2022-06-08 | Stop reason: HOSPADM

## 2022-06-08 RX ORDER — LIDOCAINE HYDROCHLORIDE 20 MG/ML
INJECTION, SOLUTION EPIDURAL; INFILTRATION; INTRACAUDAL; PERINEURAL AS NEEDED
Status: DISCONTINUED | OUTPATIENT
Start: 2022-06-08 | End: 2022-06-08 | Stop reason: SURG

## 2022-06-08 RX ORDER — MIDAZOLAM HYDROCHLORIDE 1 MG/ML
2 INJECTION INTRAMUSCULAR; INTRAVENOUS ONCE
Status: COMPLETED | OUTPATIENT
Start: 2022-06-08 | End: 2022-06-08

## 2022-06-08 RX ORDER — PROPOFOL 10 MG/ML
VIAL (ML) INTRAVENOUS AS NEEDED
Status: DISCONTINUED | OUTPATIENT
Start: 2022-06-08 | End: 2022-06-08 | Stop reason: SURG

## 2022-06-08 RX ORDER — ONDANSETRON 2 MG/ML
4 INJECTION INTRAMUSCULAR; INTRAVENOUS ONCE AS NEEDED
Status: DISCONTINUED | OUTPATIENT
Start: 2022-06-08 | End: 2022-06-08 | Stop reason: HOSPADM

## 2022-06-08 RX ORDER — PROMETHAZINE HYDROCHLORIDE 12.5 MG/1
25 TABLET ORAL ONCE AS NEEDED
Status: DISCONTINUED | OUTPATIENT
Start: 2022-06-08 | End: 2022-06-08 | Stop reason: HOSPADM

## 2022-06-08 RX ORDER — ONDANSETRON 2 MG/ML
INJECTION INTRAMUSCULAR; INTRAVENOUS AS NEEDED
Status: DISCONTINUED | OUTPATIENT
Start: 2022-06-08 | End: 2022-06-08 | Stop reason: SURG

## 2022-06-08 RX ORDER — HYDROCODONE BITARTRATE AND ACETAMINOPHEN 7.5; 325 MG/1; MG/1
1 TABLET ORAL ONCE AS NEEDED
Status: DISCONTINUED | OUTPATIENT
Start: 2022-06-08 | End: 2022-06-08 | Stop reason: HOSPADM

## 2022-06-08 RX ADMIN — SODIUM CHLORIDE, POTASSIUM CHLORIDE, SODIUM LACTATE AND CALCIUM CHLORIDE 9 ML/HR: 600; 310; 30; 20 INJECTION, SOLUTION INTRAVENOUS at 14:21

## 2022-06-08 RX ADMIN — FENTANYL CITRATE 100 MCG: 50 INJECTION, SOLUTION INTRAMUSCULAR; INTRAVENOUS at 15:16

## 2022-06-08 RX ADMIN — PROPOFOL 250 MG: 10 INJECTION, EMULSION INTRAVENOUS at 15:18

## 2022-06-08 RX ADMIN — SUGAMMADEX 200 MG: 100 INJECTION, SOLUTION INTRAVENOUS at 15:54

## 2022-06-08 RX ADMIN — KETOROLAC TROMETHAMINE 30 MG: 30 INJECTION, SOLUTION INTRAMUSCULAR; INTRAVENOUS at 15:51

## 2022-06-08 RX ADMIN — LIDOCAINE HYDROCHLORIDE 100 MG: 20 INJECTION, SOLUTION EPIDURAL; INFILTRATION; INTRACAUDAL; PERINEURAL at 15:16

## 2022-06-08 RX ADMIN — ACETAMINOPHEN 1000 MG: 500 TABLET ORAL at 14:18

## 2022-06-08 RX ADMIN — FENTANYL CITRATE 50 MCG: 50 INJECTION, SOLUTION INTRAMUSCULAR; INTRAVENOUS at 15:36

## 2022-06-08 RX ADMIN — LEVOFLOXACIN 500 MG: 500 INJECTION, SOLUTION INTRAVENOUS at 15:25

## 2022-06-08 RX ADMIN — FENTANYL CITRATE 50 MCG: 50 INJECTION, SOLUTION INTRAMUSCULAR; INTRAVENOUS at 15:55

## 2022-06-08 RX ADMIN — ROCURONIUM BROMIDE 40 MG: 10 INJECTION INTRAVENOUS at 15:18

## 2022-06-08 RX ADMIN — ONDANSETRON 4 MG: 2 INJECTION INTRAMUSCULAR; INTRAVENOUS at 15:37

## 2022-06-08 RX ADMIN — MIDAZOLAM HYDROCHLORIDE 2 MG: 1 INJECTION, SOLUTION INTRAMUSCULAR; INTRAVENOUS at 14:20

## 2022-06-08 RX ADMIN — DEXAMETHASONE SODIUM PHOSPHATE 4 MG: 4 INJECTION, SOLUTION INTRA-ARTICULAR; INTRALESIONAL; INTRAMUSCULAR; INTRAVENOUS; SOFT TISSUE at 15:37

## 2022-06-08 NOTE — DISCHARGE INSTRUCTIONS
DISCHARGE INSTRUCTIONS  Ureteroscopy Lasertripsy      For your surgery you had:  General anesthesia (you may have a sore throat for the first 24 hours)    You may experience dizziness, drowsiness, or lightheadedness for several hours following surgery.  Do not stay alone today or tonight.  Limit your activity for 24 hours.  You should not drive or operate machinery, drink alcohol, or sign legally binding documents for 24 hours or while you are taking pain medication.  Resume your diet slowly.  Follow any special dietary instructions you may have been given by your doctor.    Last dose of pain medication was given at:    Tylenol 1000 mg @ 1418 mg .     NOTIFY YOUR DOCTOR IF YOU EXPERIENCE ANY OF THE FOLLOWING:  Temperature greater than 101 degrees Fahrenheit  Shaking Chills  Redness or excessive drainage from incision  Nausea, vomiting and/or pain that is not controlled by prescribed medications  Increase in bleeding or bleeding that is excessive  Unable to urinate in 6 hours after surgery  If unable to reach your doctor, please go to the closest Emergency Room  Strain urine if instructed by physician.  Collect any fragments and take with you on your scheduled appointment. You may pass stone pieces or small blood clots.  Blood in your urine is normal.  It could be light pink to cherry color.  Drink 6-8 glasses of fluid each day to assist with passing of stone fragments.  Back pain is common.  It may feel like a dull ache or back spasm.  Urine will be bloody for several days.  Slight redness or bruising may be noticed on treated side.  If you have difficulty urinating, try sitting in a bathtub of warm water.        SPECIAL INSTRUCTIONS:  -Pull stent out on Sunday per instructions.  -Call office tomorrow to make follow up appointment for 4 weeks, after renal ultrasound appointment on 6/29/22 @ 1:00. ( Must arrive 30 minutes early with full bladder to renal ultrasound.)

## 2022-06-08 NOTE — ANESTHESIA PREPROCEDURE EVALUATION
Anesthesia Evaluation     Patient summary reviewed and Nursing notes reviewed   no history of anesthetic complications:  NPO Solid Status: > 8 hours  NPO Liquid Status: > 2 hours           Airway   Mallampati: II  TM distance: >3 FB  Neck ROM: full  No difficulty expected  Dental      Pulmonary - negative pulmonary ROS and normal exam    breath sounds clear to auscultation  Cardiovascular - normal exam  Exercise tolerance: good (4-7 METS)    Rhythm: regular  Rate: normal    (+) hypertension, hyperlipidemia,       Neuro/Psych- negative ROS  GI/Hepatic/Renal/Endo    (+)   renal disease stones,     Musculoskeletal     Abdominal    Substance History      OB/GYN          Other                        Anesthesia Plan    ASA 2     general       Anesthetic plan, risks, benefits, and alternatives have been provided, discussed and informed consent has been obtained with: patient.        CODE STATUS:

## 2022-06-08 NOTE — OP NOTE
CYSTOSCOPY URETEROSCOPY  Procedure Report    Patient Name:  Paul Almendarez  YOB: 1970    Date of Surgery:  6/8/2022      Pre-op Diagnosis:   Ureteral stone [N20.1]       Postop diagnosis:    Same    Procedure/CPT® Codes:      Procedure(s):    Cystoscopy   left ureteroscopy with laser and basket extraction of stone  Left ureteral stent placement, 6 x 26 with string left on    Staff:  Surgeon(s):  Gwyn Azul MD    Assistant: Eddie Carney RN CSA    Anesthesia: General    Estimated Blood Loss: minimal    Implants:    Implant Name Type Inv. Item Serial No.  Lot No. LRB No. Used Action   STNT URETRL CLASSC DBL PIG 6F 26CM - [id number] Stent STNT URETRL CLASSC DBL PIG 6F 26CM  [email or web address] [id number] Left 1 Implanted       Specimen:          Specimens     ID Source Type Tests Collected By Collected At Frozen?    A Kidney, Left Calculus · TISSUE PATHOLOGY EXAM  · STONE ANALYSIS   Gwyn Azul MD 6/8/22 1549 No    Description: Left Renal stones    This specimen was not marked as sent.              Findings:     Normal bladder    9 mm stone removed in its entirety from the left collecting system.  No stones left in the left side    Stent placed with string    Complications: none    Description of Procedure:     After informed consent patient taken to the operating room.  Patient was laid supine and placed under general anesthesia by the anesthesia team.  At this point patient was placed in dorsal lithotomy position and prepped and draped in normal sterile fashion.  A multidisciplinary timeout was undertaken documenting the correct patient site and procedure.  At this point a 22 rigid cystoscope was placed into the urethra . Bladder was normal.  At this point a Glidewire was placed up the left     ureter without any issue under fluoroscopic guidance.  I then placed a dual-lumen catheter and a stiff wire alongside the Glidewire under fluoroscopic guidance.  The  dual-lumen was removed and a ureteral access sheath was placed into the distal ureter without any problem.  I removed the obturator and wire and placed a flexible ureteroscope up the ueter.  The stone was identified.  Stone was lasered into multiple small fragments with a 272 µm laser fiber and then these pieces were basketed out with a no tip nitinol basket.  I then took the flexible ureteroscope up and check the rest of the ureter and the upper collecting system.  There were no further stones.  Brought the actual sheath out under direct vision and there was no further stones.    Left side was free of stones.  A 6 x 26 ureteral stent was then placed over the Glidewire through a rigid cystoscope without issue and had a good curl in the bladder under direct vision and a good curl in the   Left renal pelvis under fluoroscopy.  Bladder was drained.  Patient tolerated the procedure well, he was taken to the postanesthesia care unit without issue.    String left on stent    Assistant: Eddie Carney RN CSA  was responsible for performing the following activities: Held/Positioned Camera and their skilled assistance was necessary for the success of this case.    Gwyn Azul MD     Date: 6/8/2022  Time: 15:54 EDT

## 2022-06-16 LAB
CALCIUM OXALATE DIHYDRATE MFR STONE IR: 30 %
COLOR STONE: NORMAL
COM MFR STONE: 40 %
COMPN STONE: NORMAL
HYDROXYAPATITE 24H ENGDIFF UR: 30 %
LABORATORY COMMENT REPORT: NORMAL
Lab: NORMAL
Lab: NORMAL
PHOTO: NORMAL
SIZE STONE: NORMAL MM
SPEC SOURCE SUBJ: NORMAL
STONE ANALYSIS-IMP: NORMAL
WT STONE: 31 MG

## 2022-06-27 ENCOUNTER — APPOINTMENT (OUTPATIENT)
Dept: CT IMAGING | Facility: HOSPITAL | Age: 52
End: 2022-06-27

## 2022-06-29 ENCOUNTER — HOSPITAL ENCOUNTER (OUTPATIENT)
Dept: ULTRASOUND IMAGING | Facility: HOSPITAL | Age: 52
Discharge: HOME | End: 2022-06-29
Admitting: UROLOGY

## 2022-06-29 DIAGNOSIS — N20.1 URETERAL STONE: ICD-10-CM

## 2022-06-29 PROCEDURE — 76775 US EXAM ABDO BACK WALL LIM: CPT

## 2022-07-20 ENCOUNTER — TELEPHONE (OUTPATIENT)
Dept: UROLOGY | Facility: CLINIC | Age: 52
End: 2022-07-20

## 2022-07-20 NOTE — TELEPHONE ENCOUNTER
Patient called and he said it is okay to move his appointment on 07/26/22 to 1:45.  Laura is moving on the schedule.

## 2022-07-25 NOTE — PROGRESS NOTES
Chief Complaint    Urologic complaint    Subjective          Paul Almendarez presents to [facility] UROLOGY  History of Present Illness    50-year-old gentleman     Nephrolithiasis  Ureteral stone      Stent is out, no pain    6/22 renal ultrasound-negative  6/8/2022 left ureteroscopy - string left on - all stones removed  6/22 calcium oxalate monohydrate 40, calcium oxalate dihydrate 40, calcium phosphate 20      10 stones, 5 lithotripsies      6/3/22 CT abdomen/pelvis without  -  5 x 6 x 9 mm proximal left ureter.  2 -  3 mm stones in the left kidney, punctate stones in the right kidney.  Images reviewed  6/1/2022 UA-moderate blood, negative otherwise  5/22 1.1, GFR >60     No cardiopulmonary history, non-smoker, no anticoagulation    Previous    9/23/2021 right ureteroscopy with laser stent with no string -2.5 cm prostate.  1.8 cm right ureteral stone.  Removed in its entirety no other stones  10/21  Ca Oxalate - Monohydrate 40, calcium oxalate dihydrate 30, hydroxyapatite 30    9/4/2021 bilateral ureteroscopy, left stone removal, [facility] stent placed on the left, no mention of removal of nonobstructing left renal stones in the operative note.  Unable to identify the right stone because of a tortuous ureter, right stent was placed    9/4/2021 CT-abd/pelvis without -left kidney with a 5 mm mid ureteral stone, 3 nonobstructing stones in the left kidney 5 mm, 4 mm and 2 mm.  Right kidney has a 2 mm stone in the kidney nonobstructing and a 1.4 x 1.2 proximal ureteral stone      9/21 creatinine 1.1, GFR 68     6[place]14/2019 [place] ureteral stones one is about 4 mm and one is about 7 mm.  Also a 3 mm nonobstructing left renal stone.  7 mm right nonobstructing renal stone.            Results for orders placed or performed during the hospital encounter of 06/08/22   STONE ANALYSIS - Calculus, Ureter, Left    Specimen:  Ureter, Left; Calculus   Result Value Ref Range    Stone Source Comment     Color Tan     Size 4x3 mm    Stone Weight 31.0 mg    Composition Comment     Ca Oxalate - Monohydrate, Stone 40 %    Ca Oxalate-Dihydrate, Stone 30 %    HYDROXYAPATITE 30 %    Comment Comment     Photo Comment     Comments: Comment     Please note Comment     Disclaimer: Comment    Tissue Pathology Exam    Specimen: Kidney, Left; Tissue   Result Value Ref Range    Case Report       Surgical Pathology Report                         Case: [id number]                                  Authorizing Provider:  Gwyn Azul MD      Collected:           06/08/2022 03:49 PM          Ordering Location:     [facility] MAIN Received:            06/09/2022 12:29 PM                                 OR                                                                           Pathologist:           Bhupendra Curiel MD                                                            Specimen:    Kidney, Left, Left Renal Stones                                                            Clinical Information       Nephrolithiasis      Final Diagnosis       Left kidney, stones, removal:   - Stone, sent for chemical analysis (gross only diagnosis)         Gross Description       1. Kidney, Left.  Left renal stones: Received in a dry container is a single 0.5 cm calculus.  PLEASE NOTE: The labeling identifies multiple stones however only a single calculus is identified within the container.  The specimen is also examined by staff pathologist VICTOR M. The stone is forwarded for composition analysis.  Gross only at this facility.  [facility]             Past History:  Medical History: has a past medical history of Hyperlipidemia, Hypertension, Kidney stone, and Kidney stones.   Surgical History: has a past surgical history that includes Kidney stone surgery; ureteroscopy laser lithotripsy with stent insertion (Bilateral, 9/4/2021); cystoscopy ureteroscopy (Right,  9/23/2021); and cystoscopy ureteroscopy (Left, 6/8/2022).   Family History: family history is not on file.   Social History: reports that he quit smoking about 6 years ago. His smoking use included cigarettes. He has never used smokeless tobacco. He reports current alcohol use. He reports that he does not use drugs.  Allergies: Iodine       Current Outpatient Medications:   •  fenofibrate (Tricor) 145 MG tablet, Take 1 tablet by mouth Daily., Disp: 90 tablet, Rfl: 0  •  HYDROcodone-acetaminophen (NORCO) 7.5-325 MG per tablet, Take 1 tablet by mouth Every 6 (Six) Hours As Needed for Moderate Pain  (Pain)., Disp: 15 tablet, Rfl: 0  •  olmesartan (Benicar) 20 MG tablet, Take 1 tablet by mouth Daily for 90 days., Disp: 90 tablet, Rfl: 1     Physical exam       Alert and orient x3      Grossly oriented to person, place and time, judgment is intact, normal mood and affect         Objective     Vital Signs:   There were no vitals taken for this visit.             Assessment and Plan    Diagnoses and all orders for this visit:    1. Nephrolithiasis (Primary)        The patient was counseled on the preventative measures of kidney stones today.  This included increasing fluid intake to make at least 1.5 ml daily, decreasing meat intake, decreasing salt intake and taking in a normal amount of calcium (1000 mg daily).  Information handout given on this today.    Not interested in metabolic stone work-up    We also discussed the DASH diet today for stone prevention and handout was given    Follow-up as needed

## 2022-07-26 ENCOUNTER — OFFICE VISIT (OUTPATIENT)
Dept: UROLOGY | Facility: CLINIC | Age: 52
End: 2022-07-26

## 2022-07-26 VITALS — RESPIRATION RATE: 19 BRPM

## 2022-07-26 DIAGNOSIS — N20.0 NEPHROLITHIASIS: Primary | ICD-10-CM

## 2022-07-26 PROCEDURE — 99212 OFFICE O/P EST SF 10 MIN: CPT | Performed by: UROLOGY

## 2022-08-03 PROCEDURE — U0004 COV-19 TEST NON-CDC HGH THRU: HCPCS | Performed by: NURSE PRACTITIONER

## 2022-08-05 ENCOUNTER — OFFICE VISIT (OUTPATIENT)
Dept: FAMILY MEDICINE CLINIC | Facility: CLINIC | Age: 52
End: 2022-08-05

## 2022-08-05 VITALS
WEIGHT: 240 LBS | SYSTOLIC BLOOD PRESSURE: 146 MMHG | BODY MASS INDEX: 35.55 KG/M2 | HEART RATE: 140 BPM | TEMPERATURE: 102.9 F | HEIGHT: 69 IN | OXYGEN SATURATION: 97 % | DIASTOLIC BLOOD PRESSURE: 89 MMHG

## 2022-08-05 DIAGNOSIS — J06.9 VIRAL UPPER RESPIRATORY TRACT INFECTION: Primary | ICD-10-CM

## 2022-08-05 LAB
EXPIRATION DATE: NORMAL
INTERNAL CONTROL: NORMAL
Lab: NORMAL
SARS-COV-2 AG UPPER RESP QL IA.RAPID: NOT DETECTED

## 2022-08-05 PROCEDURE — 87426 SARSCOV CORONAVIRUS AG IA: CPT | Performed by: FAMILY MEDICINE

## 2022-08-05 PROCEDURE — 99213 OFFICE O/P EST LOW 20 MIN: CPT | Performed by: FAMILY MEDICINE

## 2022-08-05 RX ORDER — AZITHROMYCIN 250 MG/1
TABLET, FILM COATED ORAL
Qty: 6 TABLET | Refills: 0 | Status: SHIPPED | OUTPATIENT
Start: 2022-08-05 | End: 2022-11-29

## 2022-08-05 NOTE — ASSESSMENT & PLAN NOTE
His COVID test is negative.  Since the onset of his symptoms he is continue to feel poorly with persistent fever and chills.  We will treat him as an acute sinusitis.

## 2022-08-05 NOTE — PROGRESS NOTES
Chief Complaint   Patient presents with   • Fever     Pt took OTC Motrin and Tylenol   • Headache     Pt reports began Monday evening 08/01/2022   • Bodychills     Upset stomach, vomitting   • UC Visit Follow Up     08/03/2022, Dx: Viral syndrome        Subjective     Paul Almendarez  has a past medical history of Kidney stone and Kidney stones.    URI-he states he started the beginning the week with fevers and chills.  He has had some head congestion and lots of sinus pressure.  He denies any sore throat.  He has had a mild nonproductive cough.  He was to the urgent care 2 days ago and had a negative COVID test.  Since then his his symptoms have just been persistent.  He has been taking Motrin and Tylenol for his fevers and body aches but now it has upset his stomach.      PHQ-2 Depression Screening  Little interest or pleasure in doing things?     Feeling down, depressed, or hopeless?     PHQ-2 Total Score     PHQ-9 Depression Screening  Little interest or pleasure in doing things?     Feeling down, depressed, or hopeless?     Trouble falling or staying asleep, or sleeping too much?     Feeling tired or having little energy?     Poor appetite or overeating?     Feeling bad about yourself - or that you are a failure or have let yourself or your family down?     Trouble concentrating on things, such as reading the newspaper or watching television?     Moving or speaking so slowly that other people could have noticed? Or the opposite - being so fidgety or restless that you have been moving around a lot more than usual?     Thoughts that you would be better off dead, or of hurting yourself in some way?     PHQ-9 Total Score     If you checked off any problems, how difficult have these problems made it for you to do your work, take care of things at home, or get along with other people?       Allergies   Allergen Reactions   • Iodine Hives, Shortness Of Breath and Swelling       Prior to Admission medications     Medication Sig Start Date End Date Taking? Authorizing Provider   olmesartan (Benicar) 20 MG tablet Take 1 tablet by mouth Daily for 90 days. 11/5/21 8/3/22  Andrade Johnson,    fenofibrate (Tricor) 145 MG tablet Take 1 tablet by mouth Daily. 22  Andrade Johnson DO        Patient Active Problem List   Diagnosis   • Kidney stone   • Hospital discharge follow-up   • Essential hypertension   • Nephrolithiasis   • Hypertriglyceridemia   • Gross hematuria   • Viral upper respiratory tract infection        Past Surgical History:   Procedure Laterality Date   • CYSTOSCOPY URETEROSCOPY Right 2021    Procedure: CYSTOSCOPY, RIGHT URETEROSCOPY, LASERTRIPSY, STONE BASKET EXTRACTION AND STENT EXCHANGE;  Surgeon: Gwyn Azul MD;  Location: [facility];  Service: Urology;  Laterality: Right;   • CYSTOSCOPY URETEROSCOPY Left 2022    Procedure: Cystoscopy with left ureteroscopy with laser and left ureteral stent placement;  Surgeon: Gwyn Azul MD;  Location: [facility];  Service: Urology;  Laterality: Left;   • KIDNEY STONE SURGERY     • URETEROSCOPY LASER LITHOTRIPSY WITH STENT INSERTION Bilateral 2021    Procedure: CYSTOSCOPY, BILATERAL URETEROSCOPY  AND  LEFT LASER LITHOTRIPSY WITH STONE REMOVAL ,  RIGHT RETROGRADE AND RIGHT URETERAL  STENT INSERTION;  Surgeon: Andre Majano MD;  Location: [facility];  Service: Urology;  Laterality: Bilateral;       Social History     Socioeconomic History   • Marital status:    Tobacco Use   • Smoking status: Former Smoker     Types: Cigarettes     Quit date: 2016     Years since quittin.5   • Smokeless tobacco: Never Used   Vaping Use   • Vaping Use: Never used   Substance and Sexual Activity   • Alcohol use: Yes     Comment: occasionally   • Drug use: Never   • Sexual activity: Defer       Family History   Problem Relation Age of Onset   • Malig Hyperthermia Neg Hx        Family history, surgical history,  "past medical history, Allergies and meds reviewed with patient today and updated in [facility] EMR.     ROS:  Review of Systems   Constitutional: Positive for chills, fatigue and fever.   HENT: Positive for congestion, postnasal drip and sinus pressure.    Respiratory: Positive for cough. Negative for shortness of breath and wheezing.    Gastrointestinal: Negative for diarrhea.   Musculoskeletal: Positive for myalgias.       OBJECTIVE:  Vitals:    08/05/22 1425   BP: 146/89   BP Location: Left arm   Patient Position: Sitting   Cuff Size: Adult   Pulse: (!) 140   Temp: (!) 102.9 °F (39.4 °C)   TempSrc: Oral   SpO2: 97%   Weight: 109 kg (240 lb)   Height: 175.3 cm (69\")     No exam data present   Body mass index is 35.44 kg/m².  No LMP for male patient.    Physical Exam  Vitals and nursing note reviewed.   Constitutional:       General: He is not in acute distress.     Appearance: Normal appearance. He is obese.   HENT:      Head: Normocephalic.      Right Ear: Tympanic membrane, ear canal and external ear normal.      Left Ear: Tympanic membrane, ear canal and external ear normal.      Nose: Nose normal.      Mouth/Throat:      Mouth: Mucous membranes are moist.      Pharynx: Oropharynx is clear.   Eyes:      General: No scleral icterus.     Conjunctiva/sclera: Conjunctivae normal.      Pupils: Pupils are equal, round, and reactive to light.   Cardiovascular:      Rate and Rhythm: Normal rate and regular rhythm.      Pulses: Normal pulses.      Heart sounds: Normal heart sounds. No murmur heard.  Pulmonary:      Effort: Pulmonary effort is normal.      Breath sounds: Normal breath sounds. No wheezing, rhonchi or rales.   Musculoskeletal:      Cervical back: Neck supple. No rigidity or tenderness.   Lymphadenopathy:      Cervical: No cervical adenopathy.   Skin:     General: Skin is warm and dry.      Coloration: Skin is not jaundiced.      Findings: No rash.   Neurological:      General: No focal deficit present.      " Mental Status: He is alert and oriented to person, place, and time.   Psychiatric:         Mood and Affect: Mood normal.         Thought Content: Thought content normal.         Judgment: Judgment normal.         Procedures    Office Visit on 08/05/2022   Component Date Value Ref Range Status   • SARS Antigen 08/05/2022 Not Detected  Not Detected, Presumptive Negative Final   • Internal Control 08/05/2022 Passed  Passed Final   • Lot Number 08/05/2022 [id number]   Final   • Expiration Date 08/05/2022 10/10/2023   Final   Admission on 08/03/2022, Discharged on 08/03/2022   Component Date Value Ref Range Status   • Rapid Influenza A Ag 08/03/2022 Negative  Negative Final   • Rapid Influenza B Ag 08/03/2022 Negative  Negative Final   • Internal Control 08/03/2022 Passed  Passed Final   • Lot Number 08/03/2022 [id number]   Final   • Expiration Date 08/03/2022 07/26/23   Final   • SARS Antigen 08/03/2022 Not Detected  Not Detected, Presumptive Negative Final   • Internal Control 08/03/2022 Passed  Passed Final   • Lot Number 08/03/2022 [id number]   Final   • Expiration Date 08/03/2022 09/27/23   Final   • COVID19 08/03/2022 Not Detected  Not Detected - Ref. Range Final       ASSESSMENT/ PLAN:    Diagnoses and all orders for this visit:    1. Viral upper respiratory tract infection (Primary)  Assessment & Plan:  His COVID test is negative.  Since the onset of his symptoms he is continue to feel poorly with persistent fever and chills.  We will treat him as an acute sinusitis.    Orders:  -     POCT SARS-CoV-2 Antigen DANG    Other orders  -     azithromycin (Zithromax Z-Angel) 250 MG tablet; Take 2 tablets by mouth on day 1, then 1 tablet daily on days 2-5  Dispense: 6 tablet; Refill: 0      Orders Placed Today:     New Medications Ordered This Visit   Medications   • azithromycin (Zithromax Z-Angel) 250 MG tablet     Sig: Take 2 tablets by mouth on day 1, then 1 tablet daily on days 2-5     Dispense:  6 tablet     Refill:  0         Management Plan:     An After Visit Summary was printed and given to the patient at discharge.    Follow-up: Return if symptoms worsen or fail to improve.    Andrade Johnson DO 8/5/2022 14:51 EDT  This note was electronically signed.

## 2022-08-26 RX ORDER — FENOFIBRATE 145 MG/1
145 TABLET, COATED ORAL DAILY
Qty: 90 TABLET | Refills: 1 | Status: SHIPPED | OUTPATIENT
Start: 2022-08-26 | End: 2023-01-13 | Stop reason: SDUPTHER

## 2022-11-29 ENCOUNTER — OFFICE VISIT (OUTPATIENT)
Dept: FAMILY MEDICINE CLINIC | Facility: CLINIC | Age: 52
End: 2022-11-29

## 2022-11-29 VITALS
DIASTOLIC BLOOD PRESSURE: 105 MMHG | HEART RATE: 97 BPM | SYSTOLIC BLOOD PRESSURE: 146 MMHG | OXYGEN SATURATION: 96 % | HEIGHT: 69 IN | TEMPERATURE: 98 F | WEIGHT: 242 LBS | BODY MASS INDEX: 35.84 KG/M2

## 2022-11-29 DIAGNOSIS — I10 ESSENTIAL HYPERTENSION: Primary | ICD-10-CM

## 2022-11-29 PROCEDURE — 99213 OFFICE O/P EST LOW 20 MIN: CPT | Performed by: FAMILY MEDICINE

## 2022-11-29 RX ORDER — AMLODIPINE BESYLATE 5 MG/1
5 TABLET ORAL DAILY
Qty: 90 TABLET | Refills: 1 | Status: SHIPPED | OUTPATIENT
Start: 2022-11-29 | End: 2022-11-29

## 2022-11-29 RX ORDER — OLMESARTAN MEDOXOMIL 20 MG/1
20 TABLET ORAL DAILY
Qty: 90 TABLET | Refills: 1 | Status: SHIPPED | OUTPATIENT
Start: 2022-11-29 | End: 2023-01-13 | Stop reason: SDUPTHER

## 2022-11-29 RX ORDER — AMLODIPINE BESYLATE 5 MG/1
5 TABLET ORAL DAILY
Qty: 90 TABLET | Refills: 1 | Status: SHIPPED | OUTPATIENT
Start: 2022-11-29

## 2022-11-29 RX ORDER — OXYCODONE HYDROCHLORIDE AND ACETAMINOPHEN 5; 325 MG/1; MG/1
TABLET ORAL
COMMUNITY
Start: 2022-11-15 | End: 2022-11-29

## 2022-11-29 RX ORDER — PROMETHAZINE HYDROCHLORIDE 25 MG/1
TABLET ORAL
COMMUNITY
Start: 2022-11-15 | End: 2022-11-29

## 2022-11-29 NOTE — PROGRESS NOTES
Chief Complaint   Patient presents with   • Headache   • Blurred Vision        Subjective     Paul Almendarez  has a past medical history of Kidney stone and Kidney stones.    Headache- he states about 2 months ago he started with a daily headache.  He initially had some congestion and drainage.  He started taking Tylenol.  He was taking this then on a daily basis because he had a headache every day all day long.  He initially went to the dentist was found to had a bad tooth and subsequently had it extracted.  Despite having this done his headaches persisted.  He did in the meantime get a cyst increased sinus congestion took some decongestants.  This did help some of his congestion and drainage but his headache persisted.  Today he had an episode of some very transient blurred vision.  He has been taking his olmesartan on a daily basis.      PHQ-2 Depression Screening  Little interest or pleasure in doing things?     Feeling down, depressed, or hopeless?     PHQ-2 Total Score     PHQ-9 Depression Screening  Little interest or pleasure in doing things?     Feeling down, depressed, or hopeless?     Trouble falling or staying asleep, or sleeping too much?     Feeling tired or having little energy?     Poor appetite or overeating?     Feeling bad about yourself - or that you are a failure or have let yourself or your family down?     Trouble concentrating on things, such as reading the newspaper or watching television?     Moving or speaking so slowly that other people could have noticed? Or the opposite - being so fidgety or restless that you have been moving around a lot more than usual?     Thoughts that you would be better off dead, or of hurting yourself in some way?     PHQ-9 Total Score     If you checked off any problems, how difficult have these problems made it for you to do your work, take care of things at home, or get along with other people?       Allergies   Allergen Reactions   • Iodine Hives,  Shortness Of Breath and Swelling       Prior to Admission medications    Medication Sig Start Date End Date Taking? Authorizing Provider   fenofibrate (TRICOR) 145 MG tablet TAKE 1 TABLET BY MOUTH DAILY 8/26/22  Yes Andrade Johnson DO   olmesartan (Benicar) 20 MG tablet Take 1 tablet by mouth Daily for 90 days. 11/5/21 8/3/22  Andrade Johnson DO   azithromycin (Zithromax Z-Angel) 250 MG tablet Take 2 tablets by mouth on day 1, then 1 tablet daily on days 2-5 8/5/22 11/29/22  Andrade Johnson DO   oxyCODONE-acetaminophen (PERCOCET) 5-325 MG per tablet TAKE 1/2 TO 1 TABLET BY MOUTH EVERY 4 TO 6 HOURS AS NEEDED FOR PAIN 11/15/22 11/29/22  ProviderOrly MD   promethazine (PHENERGAN) 25 MG tablet  11/15/22 11/29/22  ProviderOrly MD        Patient Active Problem List   Diagnosis   • Kidney stone   • Hospital discharge follow-up   • Essential hypertension   • Nephrolithiasis   • Hypertriglyceridemia   • Gross hematuria   • Viral upper respiratory tract infection        Past Surgical History:   Procedure Laterality Date   • CYSTOSCOPY URETEROSCOPY Right 9/23/2021    Procedure: CYSTOSCOPY, RIGHT URETEROSCOPY, LASERTRIPSY, STONE BASKET EXTRACTION AND STENT EXCHANGE;  Surgeon: Gwyn Azul MD;  Location: [facility];  Service: Urology;  Laterality: Right;   • CYSTOSCOPY URETEROSCOPY Left 6/8/2022    Procedure: Cystoscopy with left ureteroscopy with laser and left ureteral stent placement;  Surgeon: Gwyn Azul MD;  Location: [facility] OR;  Service: Urology;  Laterality: Left;   • KIDNEY STONE SURGERY     • URETEROSCOPY LASER LITHOTRIPSY WITH STENT INSERTION Bilateral 9/4/2021    Procedure: CYSTOSCOPY, BILATERAL URETEROSCOPY  AND  LEFT LASER LITHOTRIPSY WITH STONE REMOVAL ,  RIGHT RETROGRADE AND RIGHT URETERAL  STENT INSERTION;  Surgeon: Andre Majano MD;  Location: [facility] OR;  Service: Urology;  Laterality: Bilateral;       Social History     Socioeconomic  "History   • Marital status:    Tobacco Use   • Smoking status: Former     Types: Cigarettes     Quit date: 2016     Years since quittin.9   • Smokeless tobacco: Never   Vaping Use   • Vaping Use: Never used   Substance and Sexual Activity   • Alcohol use: Yes     Comment: occasionally   • Drug use: Never   • Sexual activity: Defer       Family History   Problem Relation Age of Onset   • Malig Hyperthermia Neg Hx        Family history, surgical history, past medical history, Allergies and meds reviewed with patient today and updated in [employer] EMR.     ROS:  Review of Systems   Constitutional: Negative for fatigue.   HENT: Positive for congestion and postnasal drip.    Eyes: Positive for blurred vision.   Respiratory: Negative for chest tightness, shortness of breath and wheezing.    Cardiovascular: Negative for chest pain and palpitations.   Neurological: Positive for headache.       OBJECTIVE:  Vitals:    22 1224   BP: (!) 146/105   BP Location: Right arm   Patient Position: Sitting   Pulse: 97   Temp: 98 °F (36.7 °C)   SpO2: 96%   Weight: 110 kg (242 lb)   Height: 175.3 cm (69\")     No results found.   Body mass index is 35.74 kg/m².  No LMP for male patient.    Physical Exam  Vitals and nursing note reviewed.   Constitutional:       General: He is not in acute distress.     Appearance: Normal appearance. He is obese.   HENT:      Head: Normocephalic.      Right Ear: Tympanic membrane, ear canal and external ear normal.      Left Ear: Tympanic membrane, ear canal and external ear normal.      Nose: Nose normal.      Mouth/Throat:      Mouth: Mucous membranes are moist.      Pharynx: Oropharynx is clear.   Eyes:      General: No scleral icterus.     Conjunctiva/sclera: Conjunctivae normal.      Pupils: Pupils are equal, round, and reactive to light.   Neck:      Vascular: No carotid bruit.   Cardiovascular:      Rate and Rhythm: Normal rate and regular rhythm.      Pulses: Normal pulses.           " Carotid pulses are 2+ on the right side and 2+ on the left side.     Heart sounds: Normal heart sounds. No murmur heard.  Pulmonary:      Effort: Pulmonary effort is normal.      Breath sounds: Normal breath sounds. No wheezing, rhonchi or rales.   Musculoskeletal:      Cervical back: Neck supple. No rigidity or tenderness.   Lymphadenopathy:      Cervical: No cervical adenopathy.   Skin:     General: Skin is warm and dry.      Coloration: Skin is not jaundiced.      Findings: No rash.   Neurological:      General: No focal deficit present.      Mental Status: He is alert and oriented to person, place, and time.   Psychiatric:         Mood and Affect: Mood normal.         Thought Content: Thought content normal.         Judgment: Judgment normal.         Procedures    No visits with results within 30 Day(s) from this visit.   Latest known visit with results is:   Office Visit on 08/05/2022   Component Date Value Ref Range Status   • SARS Antigen 08/05/2022 Not Detected  Not Detected, Presumptive Negative Final   • Internal Control 08/05/2022 Passed  Passed Final   • Lot Number 08/05/2022 [id number]   Final   • Expiration Date 08/05/2022 10/10/2023   Final       ASSESSMENT/ PLAN:    Diagnoses and all orders for this visit:    1. Essential hypertension (Primary)  Assessment & Plan:  His blood pressure is elevated here today.  This is most likely the cause of his headache and his transient blurred vision.  We will add some amlodipine onto his olmesartan.    Orders:  -     Comprehensive Metabolic Panel  -     Lipid Panel    Other orders  -     olmesartan (Benicar) 20 MG tablet; Take 1 tablet by mouth Daily for 90 days.  Dispense: 90 tablet; Refill: 1  -     amLODIPine (NORVASC) 5 MG tablet; Take 1 tablet by mouth Daily.  Dispense: 90 tablet; Refill: 1      Orders Placed Today:     New Medications Ordered This Visit   Medications   • olmesartan (Benicar) 20 MG tablet     Sig: Take 1 tablet by mouth Daily for 90 days.      Dispense:  90 tablet     Refill:  1   • amLODIPine (NORVASC) 5 MG tablet     Sig: Take 1 tablet by mouth Daily.     Dispense:  90 tablet     Refill:  1        Management Plan:     An After Visit Summary was printed and given to the patient at discharge.    Follow-up: Return in about 4 weeks (around 12/27/2022) for Recheck.    Andrade Johnson,  11/29/2022 13:08 EST  This note was electronically signed.  Answers for HPI/ROS submitted by the patient on 11/29/2022  What is the primary reason for your visit?: Other  Please describe your symptoms.: Momentary blurred vision then racing heart  Have you had these symptoms before?: No  How long have you been having these symptoms?: 1-4 days  Please list any medications you are currently taking for this condition.: None  Please describe any probable cause for these symptoms. : Took Phenylephrine for decongestion and tylenol on empty stomach

## 2022-11-29 NOTE — ASSESSMENT & PLAN NOTE
His blood pressure is elevated here today.  This is most likely the cause of his headache and his transient blurred vision.  We will add some amlodipine onto his olmesartan.

## 2023-01-13 ENCOUNTER — OFFICE VISIT (OUTPATIENT)
Dept: FAMILY MEDICINE CLINIC | Facility: CLINIC | Age: 53
End: 2023-01-13
Payer: OTHER GOVERNMENT

## 2023-01-13 VITALS
BODY MASS INDEX: 35.99 KG/M2 | DIASTOLIC BLOOD PRESSURE: 83 MMHG | HEART RATE: 80 BPM | OXYGEN SATURATION: 98 % | TEMPERATURE: 97.9 F | SYSTOLIC BLOOD PRESSURE: 143 MMHG | WEIGHT: 243 LBS | HEIGHT: 69 IN

## 2023-01-13 DIAGNOSIS — I10 ESSENTIAL HYPERTENSION: Primary | ICD-10-CM

## 2023-01-13 DIAGNOSIS — R06.83 SNORING: ICD-10-CM

## 2023-01-13 PROCEDURE — 99213 OFFICE O/P EST LOW 20 MIN: CPT | Performed by: FAMILY MEDICINE

## 2023-01-13 RX ORDER — OLMESARTAN MEDOXOMIL 40 MG/1
40 TABLET ORAL DAILY
Qty: 90 TABLET | Refills: 1 | Status: SHIPPED | OUTPATIENT
Start: 2023-01-13 | End: 2023-04-13

## 2023-01-13 RX ORDER — FENOFIBRATE 145 MG/1
145 TABLET, COATED ORAL DAILY
Qty: 90 TABLET | Refills: 1 | Status: SHIPPED | OUTPATIENT
Start: 2023-01-13

## 2023-01-13 NOTE — ASSESSMENT & PLAN NOTE
He states his wife told him that he snores and has witnessed apneic episodes.  We will set him up to go to the sleep clinic for further evaluation and testing.

## 2023-01-13 NOTE — ASSESSMENT & PLAN NOTE
His blood pressure here today is somewhat elevated.  Even upon recheck it was essentially about the same.  We will increase up his olmesartan for better control.

## 2023-01-13 NOTE — PROGRESS NOTES
Chief Complaint   Patient presents with   • Follow-up     6 month    • Hypertension        Subjective     Paul Almendarez  has a past medical history of Kidney stone and Kidney stones.    Hypertension  This is a chronic problem. The current episode started more than 1 year ago. The problem is unchanged. The problem is controlled. Associated symptoms include headaches. Pertinent negatives include no anxiety, blurred vision, chest pain, malaise/fatigue, neck pain, orthopnea, palpitations, peripheral edema, PND, shortness of breath or sweats. There are no associated agents to hypertension. Risk factors for coronary artery disease include dyslipidemia and male gender. Current antihypertension treatment includes angiotensin blockers and calcium channel blockers. The current treatment provides mild improvement. There are no compliance problems.        PHQ-2 Depression Screening  Little interest or pleasure in doing things?     Feeling down, depressed, or hopeless?     PHQ-2 Total Score     PHQ-9 Depression Screening  Little interest or pleasure in doing things?     Feeling down, depressed, or hopeless?     Trouble falling or staying asleep, or sleeping too much?     Feeling tired or having little energy?     Poor appetite or overeating?     Feeling bad about yourself - or that you are a failure or have let yourself or your family down?     Trouble concentrating on things, such as reading the newspaper or watching television?     Moving or speaking so slowly that other people could have noticed? Or the opposite - being so fidgety or restless that you have been moving around a lot more than usual?     Thoughts that you would be better off dead, or of hurting yourself in some way?     PHQ-9 Total Score     If you checked off any problems, how difficult have these problems made it for you to do your work, take care of things at home, or get along with other people?       Allergies   Allergen Reactions   • Iodine Hives,  Shortness Of Breath and Swelling       Prior to Admission medications    Medication Sig Start Date End Date Taking? Authorizing Provider   amLODIPine (NORVASC) 5 MG tablet Take 1 tablet by mouth Daily. 22  Yes Andrade Johnson DO   fenofibrate (TRICOR) 145 MG tablet TAKE 1 TABLET BY MOUTH DAILY 22  Yes Andrade Johnson DO   olmesartan (Benicar) 20 MG tablet Take 1 tablet by mouth Daily for 90 days. 22 Yes Andrade Johnson DO        Patient Active Problem List   Diagnosis   • Kidney stone   • Hospital discharge follow-up   • Essential hypertension   • Nephrolithiasis   • Hypertriglyceridemia   • Gross hematuria   • Viral upper respiratory tract infection   • Snoring        Past Surgical History:   Procedure Laterality Date   • CYSTOSCOPY URETEROSCOPY Right 2021    Procedure: CYSTOSCOPY, RIGHT URETEROSCOPY, LASERTRIPSY, STONE BASKET EXTRACTION AND STENT EXCHANGE;  Surgeon: Gwyn Azul MD;  Location: [facility] [place];  Service: Urology;  Laterality: Right;   • CYSTOSCOPY URETEROSCOPY Left 2022    Procedure: Cystoscopy with left ureteroscopy with laser and left ureteral stent placement;  Surgeon: Gwyn Azul MD;  Location: [facility] [place];  Service: Urology;  Laterality: Left;   • KIDNEY STONE SURGERY     • URETEROSCOPY LASER LITHOTRIPSY WITH STENT INSERTION Bilateral 2021    Procedure: CYSTOSCOPY, BILATERAL URETEROSCOPY  AND  LEFT LASER LITHOTRIPSY WITH STONE REMOVAL ,  RIGHT RETROGRADE AND RIGHT URETERAL  STENT INSERTION;  Surgeon: Andre Majano MD;  Location: [facility] [place];  Service: Urology;  Laterality: Bilateral;       Social History     Socioeconomic History   • Marital status:    Tobacco Use   • Smoking status: Former     Types: Cigarettes     Quit date: 2016     Years since quittin.0   • Smokeless tobacco: Never   Vaping Use   • Vaping Use: Never used   Substance and Sexual Activity   • Alcohol use: Yes     Comment:  "occasionally   • Drug use: Never   • Sexual activity: Defer       Family History   Problem Relation Age of Onset   • Malig Hyperthermia Neg Hx        Family history, surgical history, past medical history, Allergies and meds reviewed with patient today and updated in [employer] EMR.     ROS:  Review of Systems   Constitutional: Negative for fatigue and malaise/fatigue.   Eyes: Negative for blurred vision.   Respiratory: Negative for cough, chest tightness, shortness of breath and wheezing.    Cardiovascular: Negative for chest pain, palpitations, orthopnea and PND.   Musculoskeletal: Negative for neck pain.   Neurological: Negative for headache.       OBJECTIVE:  Vitals:    01/13/23 0820   BP: 143/83   BP Location: Right arm   Patient Position: Sitting   Pulse: 80   Temp: 97.9 °F (36.6 °C)   SpO2: 98%   Weight: 110 kg (243 lb)   Height: 175.3 cm (69\")     No results found.   Body mass index is 35.88 kg/m².  No LMP for male patient.    Physical Exam  Vitals and nursing note reviewed.   Constitutional:       General: He is not in acute distress.     Appearance: Normal appearance. He is obese.   HENT:      Head: Normocephalic.   Cardiovascular:      Rate and Rhythm: Normal rate and regular rhythm.      Heart sounds: Normal heart sounds. No murmur heard.  Pulmonary:      Effort: Pulmonary effort is normal.      Breath sounds: Normal breath sounds. No wheezing.   Neurological:      Mental Status: He is alert and oriented to person, place, and time.   Psychiatric:         Mood and Affect: Mood normal.         Behavior: Behavior normal.         Thought Content: Thought content normal.         Judgment: Judgment normal.         Procedures    No visits with results within 30 Day(s) from this visit.   Latest known visit with results is:   Office Visit on 08/05/2022   Component Date Value Ref Range Status   • SARS Antigen 08/05/2022 Not Detected  Not Detected, Presumptive Negative Final   • Internal Control 08/05/2022 Passed  Passed " Final   • Lot Number 08/05/2022 [id number]   Final   • Expiration Date 08/05/2022 10/10/2023   Final       ASSESSMENT/ PLAN:    Diagnoses and all orders for this visit:    1. Essential hypertension (Primary)  Assessment & Plan:  His blood pressure here today is somewhat elevated.  Even upon recheck it was essentially about the same.  We will increase up his olmesartan for better control.      2. Snoring  Assessment & Plan:  He states his wife told him that he snores and has witnessed apneic episodes.  We will set him up to go to the sleep clinic for further evaluation and testing.    Orders:  -     Ambulatory Referral to Sleep Medicine    Other orders  -     olmesartan (Benicar) 40 MG tablet; Take 1 tablet by mouth Daily for 90 days.  Dispense: 90 tablet; Refill: 1  -     fenofibrate (TRICOR) 145 MG tablet; Take 1 tablet by mouth Daily.  Dispense: 90 tablet; Refill: 1      Orders Placed Today:     New Medications Ordered This Visit   Medications   • olmesartan (Benicar) 40 MG tablet     Sig: Take 1 tablet by mouth Daily for 90 days.     Dispense:  90 tablet     Refill:  1   • fenofibrate (TRICOR) 145 MG tablet     Sig: Take 1 tablet by mouth Daily.     Dispense:  90 tablet     Refill:  1        Management Plan:     An After Visit Summary was printed and given to the patient at discharge.    Follow-up: Return in about 6 months (around 7/13/2023) for Recheck.    Andrade Johnson,  1/13/2023 09:02 EST  This note was electronically signed.  Answers for HPI/ROS submitted by the patient on 1/12/2023  What is the primary reason for your visit?: High Blood Pressure

## 2023-01-18 ENCOUNTER — TELEPHONE (OUTPATIENT)
Dept: FAMILY MEDICINE CLINIC | Facility: CLINIC | Age: 53
End: 2023-01-18

## 2023-01-18 NOTE — TELEPHONE ENCOUNTER
Caller: Paul Almendarez    Relationship: Self    Best call back number: [phone]             Additional information or concerns: PHARMACY, [facility] INFORMED PATIENT  THAT PRIOR AUTHORIZATION IS NEEDED FOR    olmesartan (Benicar) 40 MG tablet     PATIENT REQUESTS CALLBACK OR MESSAGE WHEN PRESCRIPTION HAS BEEN APPROVED AND SUBMTTED TO [facility]

## 2023-03-21 ENCOUNTER — OFFICE VISIT (OUTPATIENT)
Dept: SLEEP MEDICINE | Facility: HOSPITAL | Age: 53
End: 2023-03-21
Payer: OTHER GOVERNMENT

## 2023-03-21 VITALS
DIASTOLIC BLOOD PRESSURE: 83 MMHG | WEIGHT: 244.9 LBS | SYSTOLIC BLOOD PRESSURE: 132 MMHG | BODY MASS INDEX: 36.27 KG/M2 | HEIGHT: 69 IN | HEART RATE: 92 BPM | OXYGEN SATURATION: 99 %

## 2023-03-21 DIAGNOSIS — R06.81 WITNESSED EPISODE OF APNEA: ICD-10-CM

## 2023-03-21 DIAGNOSIS — E66.9 OBESITY (BMI 30-39.9): ICD-10-CM

## 2023-03-21 DIAGNOSIS — R06.83 SNORING: ICD-10-CM

## 2023-03-21 DIAGNOSIS — R29.818 SUSPECTED SLEEP APNEA: Primary | ICD-10-CM

## 2023-03-21 DIAGNOSIS — Z78.9 EXCESSIVE CAFFEINE INTAKE: ICD-10-CM

## 2023-03-21 DIAGNOSIS — I10 ESSENTIAL HYPERTENSION: ICD-10-CM

## 2023-03-21 PROCEDURE — G0463 HOSPITAL OUTPT CLINIC VISIT: HCPCS

## 2023-03-21 NOTE — PROGRESS NOTES
Sleep Consultation    Patient Name: Paul Almendarez  Age/Sex: 52 y.o. male  : 1970  MRN: [id number]    Date of Encounter Visit: 2023  Encounter Provider: Maximo Brower MD  Referring Provider: Andrade Johnson,*  Place of Service: [facility]  Patient Care Team:  Andrade Johnson DO as PCP - General (Family Medicine)  Gwyn Azul MD as Consulting Physician (Urology)    Subjective:     Reason for Consult: RIYA    History of Present Illness:  Paul Almendarez is a 52 y.o. male is here for evaluation of RIYA due to snoring and non refreshing sleep .    Patient complains of daytime fatigue and sleepiness with an [place] Sleepiness Scale (ESS) of 7.  Patient complains of loud snoring in all position with witnessed apnea, waking up gasping for breath.  Denies any symptoms of restless leg syndrome.   Patient denies any cataplexy, sleep paralysis or other symptoms to suggest narcolepsy.  Patient denies any parasomnias.  Denies any history of seizure disorder or recent head trauma.  Patient spends adequate amount of time in bed with no evidence of sleep restriction or improper sleep hygiene.  Bedtime is around 9 PM, wake up around 5:30 in the morning, sleep onset within 1 hour and patient wakes up groggy.  Caffeine intake is moderate, patient is a former smoker, no alcohol abuse  Comorbidities include: Hypertension    Review of Systems:   A twelve-system review was conducted and was negative except as mentioned above        Past Medical History:  Past Medical History:   Diagnosis Date   • Hypertension    • Kidney stone    • Kidney stones        Past Surgical History:   Procedure Laterality Date   • CYSTOSCOPY URETEROSCOPY Right 2021    Procedure: CYSTOSCOPY, RIGHT URETEROSCOPY, LASERTRIPSY, STONE BASKET EXTRACTION AND STENT EXCHANGE;  Surgeon: Gwyn Azul MD;  Location: [facility] OR;  Service: Urology;  Laterality: Right;   • CYSTOSCOPY  "URETEROSCOPY Left 2022    Procedure: Cystoscopy with left ureteroscopy with laser and left ureteral stent placement;  Surgeon: Gwyn Azul MD;  Location: [facility];  Service: Urology;  Laterality: Left;   • KIDNEY STONE SURGERY     • URETEROSCOPY LASER LITHOTRIPSY WITH STENT INSERTION Bilateral 2021    Procedure: CYSTOSCOPY, BILATERAL URETEROSCOPY  AND  LEFT LASER LITHOTRIPSY WITH STONE REMOVAL ,  RIGHT RETROGRADE AND RIGHT URETERAL  STENT INSERTION;  Surgeon: Andre Majano MD;  Location: [facility];  Service: Urology;  Laterality: Bilateral;       Home Medications:     Current Outpatient Medications:   •  amLODIPine (NORVASC) 5 MG tablet, Take 1 tablet by mouth Daily., Disp: 90 tablet, Rfl: 1  •  fenofibrate (TRICOR) 145 MG tablet, Take 1 tablet by mouth Daily., Disp: 90 tablet, Rfl: 1  •  olmesartan (Benicar) 40 MG tablet, Take 1 tablet by mouth Daily for 90 days., Disp: 90 tablet, Rfl: 1    Allergies:  Allergies   Allergen Reactions   • Iodine Hives, Shortness Of Breath and Swelling       Past Social History:  Social History     Socioeconomic History   • Marital status:    Tobacco Use   • Smoking status: Former     Types: Cigarettes     Quit date:      Years since quittin.2   • Smokeless tobacco: Never   Vaping Use   • Vaping Use: Never used   Substance and Sexual Activity   • Alcohol use: Not Currently     Comment: occasionally   • Drug use: Never   • Sexual activity: Defer       Past Family History:  Family History   Problem Relation Age of Onset   • Malig Hyperthermia Neg Hx         Objective:        Vital Signs:   Visit Vitals  /83   Pulse 92   Ht 175.3 cm (69\")   Wt 111 kg (244 lb 14.4 oz)   SpO2 99%   BMI 36.17 kg/m²     Wt Readings from Last 3 Encounters:   23 111 kg (244 lb 14.4 oz)   23 110 kg (243 lb)   22 110 kg (242 lb)     Neck Circumference: 17 inches    Physical Exam:   GEN:  No acute distress, alert, cooperative, well developed " obese  EYES:   Sclerae clear. No icterus. PERRL. Normal EOM  ENT:   External ears/nose normal, no oral lesions, no thrush, mucous membranes moist, Septum midline. Mallampati III airway. Enlarged uvula. Redundant membranous soft palate  NECK:  Supple, midline trachea, no JVD  LUNGS: Normal chest on inspection, CTAB, no wheezes. No rhonchi. No crackles. Respirations regular, even and unlabored.   CV:  Regular rhythm and rate. Normal S1/S2. No murmurs, gallops, or rubs noted.  ABD:  Soft, nontender and nondistended. Normal bowel sounds. No guarding  EXT:  Moves all extremities well. No cyanosis. No redness. No edema.   Skin: Dry, intact, no bleeding      Diagnostic Data:  No past studies to review     Assessment and Plan:       ICD-10-CM ICD-9-CM   1. Suspected sleep apnea  R29.818 781.99   2. Snoring  R06.83 786.09   3. Witnessed episode of apnea  R06.81 786.03   4. Obesity (BMI 30-39.9)  E66.9 278.00   5. Essential hypertension  I10 401.9   6. Excessive caffeine intake  Z78.9 V49.89       Recommendations:     He aggrees to proceed with HST, and a trial of CPAP     Patient was educated in depth about RIYA and cardiovascular consequences if left untreated, including but not limited to CHF, CAD, arrhythmias, CVA, and/or HTN. Education also provided about the diagnostic tools for RIYA, including the polysomnography and the treatment modalities, including the CPAP.     If patient has obstructive sleep apnea the recommend treatment is CPAP and will start CPAP and patient will follow up within 31-90 days after starting CPAP for compliance review.   Will address alternative treatment option if intolerant to CPAP     Adherence to the CPAP is a key factor in successful treatment of RIYA and the patient was encouraged to contact us in case of problem with the CPAP or the mask that can limit the tolerance of the compliance with the therapy.    Patient was educated about the impact of obesity on sleep apnea and the benefit of weight  loss and weight loss was recommended    Orders Placed This Encounter   Procedures   • Home Sleep Study     No orders of the defined types were placed in this encounter.     Return in about 3 months (around 6/21/2023).    Maximo Brower MD   [facility]   03/21/23  14:20 EDT    Dictated utilizing Dragon dictation

## 2023-04-20 ENCOUNTER — HOSPITAL ENCOUNTER (OUTPATIENT)
Dept: SLEEP MEDICINE | Facility: HOSPITAL | Age: 53
End: 2023-04-20
Payer: OTHER GOVERNMENT

## 2023-04-20 DIAGNOSIS — Z78.9 EXCESSIVE CAFFEINE INTAKE: ICD-10-CM

## 2023-04-20 DIAGNOSIS — R29.818 SUSPECTED SLEEP APNEA: ICD-10-CM

## 2023-04-20 DIAGNOSIS — R06.81 WITNESSED EPISODE OF APNEA: ICD-10-CM

## 2023-04-20 DIAGNOSIS — E66.9 OBESITY (BMI 30-39.9): ICD-10-CM

## 2023-04-20 DIAGNOSIS — I10 ESSENTIAL HYPERTENSION: ICD-10-CM

## 2023-04-20 DIAGNOSIS — R06.83 SNORING: ICD-10-CM

## 2023-04-20 PROCEDURE — 95806 SLEEP STUDY UNATT&RESP EFFT: CPT

## 2023-04-24 ENCOUNTER — TELEPHONE (OUTPATIENT)
Dept: SLEEP MEDICINE | Facility: HOSPITAL | Age: 53
End: 2023-04-24
Payer: OTHER GOVERNMENT

## 2023-04-24 DIAGNOSIS — G47.33 OSA (OBSTRUCTIVE SLEEP APNEA): Primary | ICD-10-CM

## 2023-05-24 RX ORDER — AMLODIPINE BESYLATE 5 MG/1
5 TABLET ORAL DAILY
Qty: 90 TABLET | Refills: 1 | Status: SHIPPED | OUTPATIENT
Start: 2023-05-24

## 2023-07-14 LAB
ALBUMIN SERPL-MCNC: 4.4 G/DL (ref 3.5–5.2)
ALBUMIN/GLOB SERPL: 1.7 G/DL
ALP SERPL-CCNC: 61 U/L (ref 39–117)
ALT SERPL W P-5'-P-CCNC: 22 U/L (ref 1–41)
ANION GAP SERPL CALCULATED.3IONS-SCNC: 8.8 MMOL/L (ref 5–15)
AST SERPL-CCNC: 17 U/L (ref 1–40)
BILIRUB SERPL-MCNC: 0.3 MG/DL (ref 0–1.2)
BUN SERPL-MCNC: 23 MG/DL (ref 6–20)
BUN/CREAT SERPL: 15.5 (ref 7–25)
CALCIUM SPEC-SCNC: 9.7 MG/DL (ref 8.6–10.5)
CHLORIDE SERPL-SCNC: 106 MMOL/L (ref 98–107)
CHOLEST SERPL-MCNC: 241 MG/DL (ref 0–200)
CO2 SERPL-SCNC: 25.2 MMOL/L (ref 22–29)
CREAT SERPL-MCNC: 1.48 MG/DL (ref 0.76–1.27)
EGFRCR SERPLBLD CKD-EPI 2021: 56.2 ML/MIN/1.73
GLOBULIN UR ELPH-MCNC: 2.6 GM/DL
GLUCOSE SERPL-MCNC: 87 MG/DL (ref 65–99)
HDLC SERPL-MCNC: 36 MG/DL (ref 40–60)
LDLC SERPL CALC-MCNC: 174 MG/DL (ref 0–100)
LDLC/HDLC SERPL: 4.76 {RATIO}
POTASSIUM SERPL-SCNC: 4.8 MMOL/L (ref 3.5–5.2)
PROT SERPL-MCNC: 7 G/DL (ref 6–8.5)
SODIUM SERPL-SCNC: 140 MMOL/L (ref 136–145)
TRIGL SERPL-MCNC: 169 MG/DL (ref 0–150)
VLDLC SERPL-MCNC: 31 MG/DL (ref 5–40)

## 2023-07-21 ENCOUNTER — HOSPITAL ENCOUNTER (EMERGENCY)
Facility: HOSPITAL | Age: 53
Discharge: HOME | End: 2023-07-21
Attending: EMERGENCY MEDICINE | Admitting: EMERGENCY MEDICINE
Payer: OTHER GOVERNMENT

## 2023-07-21 ENCOUNTER — APPOINTMENT (OUTPATIENT)
Dept: CT IMAGING | Facility: HOSPITAL | Age: 53
End: 2023-07-21
Payer: OTHER GOVERNMENT

## 2023-07-21 VITALS
DIASTOLIC BLOOD PRESSURE: 77 MMHG | BODY MASS INDEX: 37 KG/M2 | OXYGEN SATURATION: 97 % | HEART RATE: 91 BPM | RESPIRATION RATE: 18 BRPM | WEIGHT: 249.78 LBS | HEIGHT: 69 IN | TEMPERATURE: 98 F | SYSTOLIC BLOOD PRESSURE: 141 MMHG

## 2023-07-21 DIAGNOSIS — Z87.442 HISTORY OF KIDNEY STONES: ICD-10-CM

## 2023-07-21 DIAGNOSIS — N20.1 URETEROLITHIASIS: Primary | ICD-10-CM

## 2023-07-21 LAB
ALBUMIN SERPL-MCNC: 4.5 G/DL (ref 3.5–5.2)
ALBUMIN/GLOB SERPL: 1.5 G/DL
ALP SERPL-CCNC: 65 U/L (ref 39–117)
ALT SERPL W P-5'-P-CCNC: 27 U/L (ref 1–41)
ANION GAP SERPL CALCULATED.3IONS-SCNC: 10.3 MMOL/L (ref 5–15)
AST SERPL-CCNC: 18 U/L (ref 1–40)
BACTERIA UR QL AUTO: ABNORMAL /HPF
BASOPHILS # BLD AUTO: 0.04 10*3/MM3 (ref 0–0.2)
BASOPHILS NFR BLD AUTO: 0.3 % (ref 0–1.5)
BILIRUB SERPL-MCNC: 0.3 MG/DL (ref 0–1.2)
BILIRUB UR QL STRIP: NEGATIVE
BUN SERPL-MCNC: 26 MG/DL (ref 6–20)
BUN/CREAT SERPL: 17.3 (ref 7–25)
CALCIUM SPEC-SCNC: 9.7 MG/DL (ref 8.6–10.5)
CHLORIDE SERPL-SCNC: 105 MMOL/L (ref 98–107)
CLARITY UR: CLEAR
CO2 SERPL-SCNC: 25.7 MMOL/L (ref 22–29)
COLOR UR: YELLOW
CREAT SERPL-MCNC: 1.5 MG/DL (ref 0.76–1.27)
D-LACTATE SERPL-SCNC: 1.3 MMOL/L (ref 0.5–2)
DEPRECATED RDW RBC AUTO: 39.7 FL (ref 37–54)
EGFRCR SERPLBLD CKD-EPI 2021: 55.3 ML/MIN/1.73
EOSINOPHIL # BLD AUTO: 0.53 10*3/MM3 (ref 0–0.4)
EOSINOPHIL NFR BLD AUTO: 4.2 % (ref 0.3–6.2)
ERYTHROCYTE [DISTWIDTH] IN BLOOD BY AUTOMATED COUNT: 12.4 % (ref 12.3–15.4)
GLOBULIN UR ELPH-MCNC: 3 GM/DL
GLUCOSE SERPL-MCNC: 114 MG/DL (ref 65–99)
GLUCOSE UR STRIP-MCNC: NEGATIVE MG/DL
HCT VFR BLD AUTO: 41.5 % (ref 37.5–51)
HGB BLD-MCNC: 13.7 G/DL (ref 13–17.7)
HGB UR QL STRIP.AUTO: ABNORMAL
HOLD SPECIMEN: NORMAL
HOLD SPECIMEN: NORMAL
HYALINE CASTS UR QL AUTO: ABNORMAL /LPF
IMM GRANULOCYTES # BLD AUTO: 0.05 10*3/MM3 (ref 0–0.05)
IMM GRANULOCYTES NFR BLD AUTO: 0.4 % (ref 0–0.5)
KETONES UR QL STRIP: NEGATIVE
LEUKOCYTE ESTERASE UR QL STRIP.AUTO: NEGATIVE
LIPASE SERPL-CCNC: 54 U/L (ref 13–60)
LYMPHOCYTES # BLD AUTO: 3.31 10*3/MM3 (ref 0.7–3.1)
LYMPHOCYTES NFR BLD AUTO: 26.2 % (ref 19.6–45.3)
MCH RBC QN AUTO: 28.8 PG (ref 26.6–33)
MCHC RBC AUTO-ENTMCNC: 33 G/DL (ref 31.5–35.7)
MCV RBC AUTO: 87.2 FL (ref 79–97)
MONOCYTES # BLD AUTO: 1.09 10*3/MM3 (ref 0.1–0.9)
MONOCYTES NFR BLD AUTO: 8.6 % (ref 5–12)
NEUTROPHILS NFR BLD AUTO: 60.3 % (ref 42.7–76)
NEUTROPHILS NFR BLD AUTO: 7.6 10*3/MM3 (ref 1.7–7)
NITRITE UR QL STRIP: NEGATIVE
NRBC BLD AUTO-RTO: 0 /100 WBC (ref 0–0.2)
PH UR STRIP.AUTO: 6.5 [PH] (ref 5–8)
PLATELET # BLD AUTO: 301 10*3/MM3 (ref 140–450)
PMV BLD AUTO: 9.9 FL (ref 6–12)
POTASSIUM SERPL-SCNC: 4.2 MMOL/L (ref 3.5–5.2)
PROT SERPL-MCNC: 7.5 G/DL (ref 6–8.5)
PROT UR QL STRIP: NEGATIVE
RBC # BLD AUTO: 4.76 10*6/MM3 (ref 4.14–5.8)
RBC # UR STRIP: ABNORMAL /HPF
REF LAB TEST METHOD: ABNORMAL
SODIUM SERPL-SCNC: 141 MMOL/L (ref 136–145)
SP GR UR STRIP: 1.02 (ref 1–1.03)
SQUAMOUS #/AREA URNS HPF: ABNORMAL /HPF
UROBILINOGEN UR QL STRIP: ABNORMAL
WBC # UR STRIP: ABNORMAL /HPF
WBC NRBC COR # BLD: 12.62 10*3/MM3 (ref 3.4–10.8)
WHOLE BLOOD HOLD COAG: NORMAL
WHOLE BLOOD HOLD SPECIMEN: NORMAL

## 2023-07-21 PROCEDURE — 99283 EMERGENCY DEPT VISIT LOW MDM: CPT

## 2023-07-21 PROCEDURE — 36415 COLL VENOUS BLD VENIPUNCTURE: CPT

## 2023-07-21 PROCEDURE — 25010000002 ONDANSETRON PER 1 MG

## 2023-07-21 PROCEDURE — 80053 COMPREHEN METABOLIC PANEL: CPT | Performed by: EMERGENCY MEDICINE

## 2023-07-21 PROCEDURE — 25010000002 KETOROLAC TROMETHAMINE PER 15 MG

## 2023-07-21 PROCEDURE — 96374 THER/PROPH/DIAG INJ IV PUSH: CPT

## 2023-07-21 PROCEDURE — 83605 ASSAY OF LACTIC ACID: CPT | Performed by: EMERGENCY MEDICINE

## 2023-07-21 PROCEDURE — 74176 CT ABD & PELVIS W/O CONTRAST: CPT

## 2023-07-21 PROCEDURE — 96375 TX/PRO/DX INJ NEW DRUG ADDON: CPT

## 2023-07-21 PROCEDURE — 83690 ASSAY OF LIPASE: CPT | Performed by: EMERGENCY MEDICINE

## 2023-07-21 PROCEDURE — 81001 URINALYSIS AUTO W/SCOPE: CPT | Performed by: EMERGENCY MEDICINE

## 2023-07-21 PROCEDURE — 85025 COMPLETE CBC W/AUTO DIFF WBC: CPT | Performed by: EMERGENCY MEDICINE

## 2023-07-21 RX ORDER — TAMSULOSIN HYDROCHLORIDE 0.4 MG/1
0.4 CAPSULE ORAL ONCE
Status: COMPLETED | OUTPATIENT
Start: 2023-07-21 | End: 2023-07-21

## 2023-07-21 RX ORDER — TAMSULOSIN HYDROCHLORIDE 0.4 MG/1
1 CAPSULE ORAL DAILY
Qty: 10 CAPSULE | Refills: 0 | Status: SHIPPED | OUTPATIENT
Start: 2023-07-21

## 2023-07-21 RX ORDER — KETOROLAC TROMETHAMINE 30 MG/ML
30 INJECTION, SOLUTION INTRAMUSCULAR; INTRAVENOUS ONCE
Status: COMPLETED | OUTPATIENT
Start: 2023-07-21 | End: 2023-07-21

## 2023-07-21 RX ORDER — ONDANSETRON 2 MG/ML
4 INJECTION INTRAMUSCULAR; INTRAVENOUS ONCE
Status: COMPLETED | OUTPATIENT
Start: 2023-07-21 | End: 2023-07-21

## 2023-07-21 RX ORDER — SODIUM CHLORIDE 0.9 % (FLUSH) 0.9 %
10 SYRINGE (ML) INJECTION AS NEEDED
Status: DISCONTINUED | OUTPATIENT
Start: 2023-07-21 | End: 2023-07-22 | Stop reason: HOSPADM

## 2023-07-21 RX ORDER — KETOROLAC TROMETHAMINE 10 MG/1
10 TABLET, FILM COATED ORAL EVERY 6 HOURS PRN
Qty: 15 TABLET | Refills: 0 | Status: CANCELLED | OUTPATIENT
Start: 2023-07-21

## 2023-07-21 RX ORDER — HYDROCODONE BITARTRATE AND ACETAMINOPHEN 5; 325 MG/1; MG/1
1 TABLET ORAL EVERY 6 HOURS PRN
Status: DISCONTINUED | OUTPATIENT
Start: 2023-07-21 | End: 2023-07-22 | Stop reason: HOSPADM

## 2023-07-21 RX ORDER — HYDROCODONE BITARTRATE AND ACETAMINOPHEN 5; 325 MG/1; MG/1
1 TABLET ORAL EVERY 6 HOURS PRN
Qty: 6 TABLET | Refills: 0 | Status: SHIPPED | OUTPATIENT
Start: 2023-07-21

## 2023-07-21 RX ORDER — ONDANSETRON 4 MG/1
4 TABLET, ORALLY DISINTEGRATING ORAL 4 TIMES DAILY PRN
Qty: 9 TABLET | Refills: 0 | Status: SHIPPED | OUTPATIENT
Start: 2023-07-21

## 2023-07-21 RX ADMIN — TAMSULOSIN HYDROCHLORIDE 0.4 MG: 0.4 CAPSULE ORAL at 21:49

## 2023-07-21 RX ADMIN — ONDANSETRON 4 MG: 2 INJECTION INTRAMUSCULAR; INTRAVENOUS at 20:42

## 2023-07-21 RX ADMIN — SODIUM CHLORIDE 1000 ML: 9 INJECTION, SOLUTION INTRAVENOUS at 20:41

## 2023-07-21 RX ADMIN — KETOROLAC TROMETHAMINE 30 MG: 30 INJECTION, SOLUTION INTRAMUSCULAR; INTRAVENOUS at 20:42

## 2023-07-21 NOTE — ED PROVIDER NOTES
Time: 7:58 PM EDT  Date of encounter:  7/21/2023  Independent Historian/Clinical History and Information was obtained by:   Patient    History is limited by: N/A    Chief Complaint: flank pain      History of Present Illness:  Patient is a 52 y.o. year old male who presents to the emergency department for evaluation of flank pain radiating to right lower side.  Patient states the pain has been intermittent since last Friday but today has been constant.  He rates his pain a 5 out of 10.  He took Tylenol earlier.  Patient has a history of kidney stones.  He currently admits to nausea.  Denies dysuria, hematuria, frequency, fever, chills and vomiting.    HPI    Patient Care Team  Primary Care Provider: Andrade Johnson DO    Past Medical History:     Allergies   Allergen Reactions    Iodine Hives, Shortness Of Breath and Swelling     Past Medical History:   Diagnosis Date    Hypertension     Kidney stone     Kidney stones      Past Surgical History:   Procedure Laterality Date    CYSTOSCOPY URETEROSCOPY Right 9/23/2021    Procedure: CYSTOSCOPY, RIGHT URETEROSCOPY, LASERTRIPSY, STONE BASKET EXTRACTION AND STENT EXCHANGE;  Surgeon: Gwyn Azul MD;  Location: [facility];  Service: Urology;  Laterality: Right;    CYSTOSCOPY URETEROSCOPY Left 6/8/2022    Procedure: Cystoscopy with left ureteroscopy with laser and left ureteral stent placement;  Surgeon: Gwyn Azul MD;  Location: [facility];  Service: Urology;  Laterality: Left;    KIDNEY STONE SURGERY      URETEROSCOPY LASER LITHOTRIPSY WITH STENT INSERTION Bilateral 9/4/2021    Procedure: CYSTOSCOPY, BILATERAL URETEROSCOPY  AND  LEFT LASER LITHOTRIPSY WITH STONE REMOVAL ,  RIGHT RETROGRADE AND RIGHT URETERAL  STENT INSERTION;  Surgeon: Andre Majano MD;  Location: [facility] OR;  Service: Urology;  Laterality: Bilateral;     Family History   Problem Relation Age of Onset    Malig Hyperthermia Neg Hx        Home Medications:  Prior to  "Admission medications    Medication Sig Start Date End Date Taking? Authorizing Provider   amLODIPine (NORVASC) 5 MG tablet Take 1 tablet by mouth Daily. 23   Andrade Johnson DO   atorvastatin (LIPITOR) 20 MG tablet Take 1 tablet by mouth Daily. 23   Andrade Johnson DO   fenofibrate (TRICOR) 145 MG tablet Take 1 tablet by mouth Daily. 23   Andrade Johnson DO   olmesartan (Benicar) 40 MG tablet Take 1 tablet by mouth Daily for 90 days. 7/14/23 10/12/23  Andrade Johnson DO        Social History:   Social History     Tobacco Use    Smoking status: Former     Types: Cigarettes     Quit date:      Years since quittin.5    Smokeless tobacco: Never   Vaping Use    Vaping Use: Never used   Substance Use Topics    Alcohol use: Not Currently     Comment: occasionally    Drug use: Never         Review of Systems:  Review of Systems   Constitutional: Negative.    HENT: Negative.     Eyes: Negative.    Respiratory: Negative.     Cardiovascular: Negative.    Gastrointestinal:  Positive for abdominal pain and nausea. Negative for vomiting.   Endocrine: Negative.    Genitourinary:  Positive for flank pain. Negative for dysuria, frequency and hematuria.   Skin: Negative.    Allergic/Immunologic: Negative.    Neurological: Negative.    Hematological: Negative.    Psychiatric/Behavioral: Negative.        Physical Exam:  /77 (BP Location: Right arm, Patient Position: Sitting)   Pulse 91   Temp 98 °F (36.7 °C) (Oral)   Resp 18   Ht 175.3 cm (69\")   Wt 113 kg (249 lb 12.5 oz)   SpO2 97%   BMI 36.89 kg/m²     Physical Exam  Vitals and nursing note reviewed.   Constitutional:       Appearance: Normal appearance. He is normal weight.   HENT:      Head: Normocephalic and atraumatic.      Nose: Nose normal.      Mouth/Throat:      Mouth: Mucous membranes are moist.   Eyes:      Extraocular Movements: Extraocular movements intact.      Conjunctiva/sclera: Conjunctivae " normal.      Pupils: Pupils are equal, round, and reactive to light.   Cardiovascular:      Rate and Rhythm: Normal rate and regular rhythm.      Heart sounds: Normal heart sounds.   Pulmonary:      Effort: Pulmonary effort is normal.      Breath sounds: Normal breath sounds.   Abdominal:      General: Abdomen is flat.      Palpations: Abdomen is soft.      Tenderness: There is abdominal tenderness. There is right CVA tenderness. There is no left CVA tenderness.   Musculoskeletal:         General: Normal range of motion.      Cervical back: Normal range of motion and neck supple.   Skin:     General: Skin is warm and dry.   Neurological:      General: No focal deficit present.      Mental Status: He is alert and oriented to person, place, and time.   Psychiatric:         Mood and Affect: Mood normal.         Behavior: Behavior normal.             Procedures:  Procedures      Medical Decision Making:      Comorbidities that affect care:    Kidney stones, Hypertension    External Notes reviewed:    Previous Clinic Note: Last urology appointment was july 26, 2022 with Dr. Azul for nephrolithiasis and Previous Operation Note: He had a stent placed in the left ureter on June 8, 2022 by Dr. Azul      The following orders were placed and all results were independently analyzed by me:  Orders Placed This Encounter   Procedures    CT Abdomen Pelvis Stone Protocol    Turney Draw    Comprehensive Metabolic Panel    Lipase    Urinalysis With Microscopic If Indicated (No Culture) - Urine, Clean Catch    Lactic Acid, Plasma    CBC Auto Differential    Urinalysis, Microscopic Only - Urine, Clean Catch    NPO Diet NPO Type: Strict NPO    Undress & Gown    Urine strainer please  Misc Nursing Order (Specify)    Insert Peripheral IV    CBC & Differential    Green Top (Gel)    Lavender Top    Gold Top - SST    Light Blue Top       Medications Given in the Emergency Department:  Medications   sodium chloride 0.9 % flush 10 mL  (has no administration in time range)   tamsulosin (FLOMAX) 24 hr capsule 0.4 mg (has no administration in time range)   HYDROcodone-acetaminophen (NORCO) 5-325 MG per tablet 1 tablet (has no administration in time range)   ketorolac (TORADOL) injection 30 mg (30 mg Intravenous Given 7/21/23 2042)   ondansetron (ZOFRAN) injection 4 mg (4 mg Intravenous Given 7/21/23 2042)   sodium chloride 0.9 % bolus 1,000 mL (1,000 mL Intravenous New Bag 7/21/23 2041)        ED Course:    ED Course as of 07/21/23 2138 Fri Jul 21, 2023 2106 Patient's pain is controlled and currently rates it a 1 out of 10.  His nausea has also resolved.    Patient has a fairly elevated WBC, normal kidney function is fair.  No leukocytes or bacteria seen in his urine.  Patient's pain is controlled he should be able to follow-up with Dr. Azul on Monday if needed.    I have discussed this with Dr. Ceballos   [AJ]      ED Course User Index  [AJ] Lizette Brewer, IVONNE       Labs:    Lab Results (last 24 hours)       Procedure Component Value Units Date/Time    CBC & Differential [[id number]]  (Abnormal) Collected: 07/21/23 1913    Specimen: Blood Updated: 07/21/23 1925    Narrative:      The following orders were created for panel order CBC & Differential.  Procedure                               Abnormality         Status                     ---------                               -----------         ------                     CBC Auto Differential[[id number]]        Abnormal            Final result                 Please view results for these tests on the individual orders.    Comprehensive Metabolic Panel [[id number]]  (Abnormal) Collected: 07/21/23 1913    Specimen: Blood Updated: 07/21/23 1954     Glucose 114 mg/dL      BUN 26 mg/dL      Creatinine 1.50 mg/dL      Sodium 141 mmol/L      Potassium 4.2 mmol/L      Chloride 105 mmol/L      CO2 25.7 mmol/L      Calcium 9.7 mg/dL      Total Protein 7.5 g/dL      Albumin 4.5 g/dL      ALT  (SGPT) 27 U/L      AST (SGOT) 18 U/L      Alkaline Phosphatase 65 U/L      Total Bilirubin 0.3 mg/dL      Globulin 3.0 gm/dL      A/G Ratio 1.5 g/dL      BUN/Creatinine Ratio 17.3     Anion Gap 10.3 mmol/L      eGFR 55.3 mL/min/1.73     Narrative:      GFR Normal >60  Chronic Kidney Disease <60  Kidney Failure <15      Lipase [[id number]]  (Normal) Collected: 07/21/23 1913    Specimen: Blood Updated: 07/21/23 1954     Lipase 54 U/L     Lactic Acid, Plasma [[id number]]  (Normal) Collected: 07/21/23 1913    Specimen: Blood Updated: 07/21/23 1950     Lactate 1.3 mmol/L     CBC Auto Differential [[id number]]  (Abnormal) Collected: 07/21/23 1913    Specimen: Blood Updated: 07/21/23 1925     WBC 12.62 10*3/mm3      RBC 4.76 10*6/mm3      Hemoglobin 13.7 g/dL      Hematocrit 41.5 %      MCV 87.2 fL      MCH 28.8 pg      MCHC 33.0 g/dL      RDW 12.4 %      RDW-SD 39.7 fl      MPV 9.9 fL      Platelets 301 10*3/mm3      Neutrophil % 60.3 %      Lymphocyte % 26.2 %      Monocyte % 8.6 %      Eosinophil % 4.2 %      Basophil % 0.3 %      Immature Grans % 0.4 %      Neutrophils, Absolute 7.60 10*3/mm3      Lymphocytes, Absolute 3.31 10*3/mm3      Monocytes, Absolute 1.09 10*3/mm3      Eosinophils, Absolute 0.53 10*3/mm3      Basophils, Absolute 0.04 10*3/mm3      Immature Grans, Absolute 0.05 10*3/mm3      nRBC 0.0 /100 WBC     Urinalysis With Microscopic If Indicated (No Culture) - Urine, Clean Catch [[id number]]  (Abnormal) Collected: 07/21/23 1950    Specimen: Urine, Clean Catch Updated: 07/21/23 2050     Color, UA Yellow     Appearance, UA Clear     pH, UA 6.5     Specific Gravity, UA 1.018     Glucose, UA Negative     Ketones, UA Negative     Bilirubin, UA Negative     Blood, UA Small (1+)     Protein, UA Negative     Leuk Esterase, UA Negative     Nitrite, UA Negative     Urobilinogen, UA 1.0 E.U./dL    Urinalysis, Microscopic Only - Urine, Clean Catch [[id number]]  (Abnormal) Collected: 07/21/23 1950    Specimen: Urine, Clean  Catch Updated: 07/21/23 2106     RBC, UA 3-5 /HPF      WBC, UA None Seen /HPF      Bacteria, UA None Seen /HPF      Squamous Epithelial Cells, UA 0-2 /HPF      Hyaline Casts, UA None Seen /LPF      Methodology Automated Microscopy             Imaging:    CT Abdomen Pelvis Stone Protocol    Result Date: 7/21/2023  PROCEDURE: CT ABDOMEN PELVIS STONE PROTOCOL  COMPARISON: 6/3/2022.  INDICATIONS: Right-sided flank pain; right renal colic.  TECHNIQUE: 696 CT images were created without intravenous or oral contrast agent administration.   PROTOCOL:   Standard CT imaging protocol performed.    RADIATION:   Total DLP: 941.5 mGy*cm.   Automated exposure control was utilized to minimize radiation dose.  FINDINGS:  There is obstructive uropathy on the right due to a 7 mm distal right ureteral calculus, which is located at or just below the level of the pelvic inlet.  It has a CT number of about 1,198 Hounsfield units.  It is seen on axial image 174 of series 201, coronal image 94 of series 202, and sagittal image 100 of series 203.  There is associated severe right-sided hydronephrosis and right hydroureter with the finding.  Nonobstructing renal calyceal stones are seen bilaterally, measuring about 6 mm or less.  No ureterolithiasis or hydronephrosis is seen on the left.  There is atherosclerotic change without aneurysmal dilatation of the aortoiliac arterial system.  No significant change in the noncalcified, non-cavitating right lower lobe pulmonary nodules, measuring 7 mm or less.  Consider low-dose chest CT (LDCT) examination follow-up of these findings if clinically warranted and if not already being performed (such as in 6-12 months, based upon the patient's risk stratification).  No acute appendicitis is seen.  There may be scattered colonic diverticula.  No acute diverticulitis.  No mechanical bowel obstruction.  No pneumoperitoneum or pneumatosis.  No definite bowel wall thickening.  There are bilateral inguinal  hernias, larger on the left.  They do not contain bowel.  Similar findings were seen previously.  A tiny umbilical hernia is seen.  It does not contain bowel.  The gallbladder appears contracted.  No definite acute cholecystitis or pancreatitis.  No urinary bladder calculi are seen.  No other acute findings are identified.         There is obstructive uropathy on the right due to a 7 mm distal right ureteral calculus with severe right hydronephrosis.  Please see above comments for further detail.    Please note that portions of this note were completed with a voice recognition program.  HAKEEM AMEZQUITA JR, MD    Electronically Signed and Approved By: HAKEEM AMEZQUITA JR, MD on 7/21/2023 at 20:47            Differential Diagnosis and Discussion:    Flank Pain: Differential diagnosis includes but is not limited to kidney stones, pyelonephritis, musculoskeletal disorders, renal infarction, urinary tract infection, hydronephrosis, radiculopathy, aortic aneurysm, renal cell carcinoma.    All labs were reviewed and interpreted by me.  CT scan radiology impression was interpreted by me.    MDM     Amount and/or Complexity of Data Reviewed  Clinical lab tests: reviewed  Tests in the radiology section of CPT®: reviewed        Patient Care Considerations:    CONSULT: I considered consulting Dr. Azul, however patients pain is controlled, he is afebrile and not septic. He can follow-up with Dr. Azul on Monday if needed      Consultants/Shared Management Plan:    None    Social Determinants of Health:    Patient is independent, reliable, and has access to care.       Disposition and Care Coordination:    Discharged: I considered escalation of care by admitting this patient to the hospital, however the patient has improved and is suitable and stable for discharge.    I have explained the patient´s condition, diagnoses and treatment plan based on the information available to me at this time. I have answered questions and  addressed any concerns. The patient has a good  understanding of the patient´s diagnosis, condition, and treatment plan as can be expected at this point. The vital signs have been stable. The patient´s condition is stable and appropriate for discharge from the emergency department.      The patient will pursue further outpatient evaluation with the primary care physician or other designated or consulting physician as outlined in the discharge instructions. They are agreeable to this plan of care and follow-up instructions have been explained in detail. The patient has received these instructions in written format and have expressed an understanding of the discharge instructions. The patient is aware that any significant change in condition or worsening of symptoms should prompt an immediate return to this or the closest emergency department or call to 911.  I have explained discharge medications and the need for follow up with the patient/caretakers. This was also printed in the discharge instructions. Patient was discharged with the following medications and follow up:      Medication List        New Prescriptions      tamsulosin 0.4 MG capsule 24 hr capsule  Commonly known as: FLOMAX  Take 1 capsule by mouth Daily.               Where to Get Your Medications        These medications were sent to [id number] - [place] - [address] AT [facility] - [phone]  - [phone] FX  [address] [place] [place] [address]      Phone: [phone]   tamsulosin 0.4 MG capsule 24 hr capsule      Gwyn Azul MD  [address] [facility]  [place] [address]  [phone]    Schedule an appointment as soon as possible for a visit in 3 days  If symptoms worsen       Final diagnoses:   Ureterolithiasis   History of kidney stones        ED Disposition       ED Disposition   Discharge    Condition   Stable    Comment   --               This medical record created using voice  recognition software.             Lizette Brewer PA-C  07/21/23 2137       Lizette Brewer PA-C  07/21/23 2139

## 2023-07-22 NOTE — DISCHARGE INSTRUCTIONS
Your CT shows that you have a 7 mm stone in the right distal ureter  I have sent Flomax, Zofran for nausea, Toradol and hydrocodone for pain as needed    Please use urine strainer every time you urinate to see if you pass the stone next  If you have any worsening pain, fever you can return to the ED or follow-up with Dr. Azul this coming Monday.

## 2023-07-24 ENCOUNTER — TELEPHONE (OUTPATIENT)
Dept: FAMILY MEDICINE CLINIC | Facility: CLINIC | Age: 53
End: 2023-07-24

## 2023-07-24 ENCOUNTER — TELEPHONE (OUTPATIENT)
Dept: UROLOGY | Facility: CLINIC | Age: 53
End: 2023-07-24
Payer: OTHER GOVERNMENT

## 2023-07-24 DIAGNOSIS — N20.0 KIDNEY STONES: ICD-10-CM

## 2023-07-24 DIAGNOSIS — R31.0 GROSS HEMATURIA: Primary | ICD-10-CM

## 2023-07-24 NOTE — TELEPHONE ENCOUNTER
Pt was in the ER for kidney stone on 7/21. 7mm stone with severe right hydronephrosis. He needs an appt but is waiting for his [facility] Referral.

## 2023-07-25 ENCOUNTER — PREP FOR SURGERY (OUTPATIENT)
Dept: OTHER | Facility: HOSPITAL | Age: 53
End: 2023-07-25
Payer: OTHER GOVERNMENT

## 2023-07-25 ENCOUNTER — OFFICE VISIT (OUTPATIENT)
Dept: UROLOGY | Facility: CLINIC | Age: 53
End: 2023-07-25
Payer: OTHER GOVERNMENT

## 2023-07-25 DIAGNOSIS — N20.0 NEPHROLITHIASIS: Primary | ICD-10-CM

## 2023-07-25 DIAGNOSIS — N20.1 URETERAL STONE: Primary | ICD-10-CM

## 2023-07-25 PROCEDURE — 99213 OFFICE O/P EST LOW 20 MIN: CPT | Performed by: UROLOGY

## 2023-07-25 RX ORDER — SODIUM CHLORIDE 0.9 % (FLUSH) 0.9 %
3 SYRINGE (ML) INJECTION EVERY 12 HOURS SCHEDULED
Status: CANCELLED | OUTPATIENT
Start: 2023-07-25

## 2023-07-25 RX ORDER — SODIUM CHLORIDE 9 MG/ML
40 INJECTION, SOLUTION INTRAVENOUS AS NEEDED
Status: CANCELLED | OUTPATIENT
Start: 2023-07-25

## 2023-07-25 RX ORDER — LEVOFLOXACIN 5 MG/ML
500 INJECTION, SOLUTION INTRAVENOUS ONCE
Status: CANCELLED | OUTPATIENT
Start: 2023-07-25 | End: 2023-07-25

## 2023-07-25 RX ORDER — SODIUM CHLORIDE 0.9 % (FLUSH) 0.9 %
10 SYRINGE (ML) INJECTION AS NEEDED
Status: CANCELLED | OUTPATIENT
Start: 2023-07-25

## 2023-07-25 RX ORDER — SODIUM CHLORIDE 9 MG/ML
100 INJECTION, SOLUTION INTRAVENOUS CONTINUOUS
Status: CANCELLED | OUTPATIENT
Start: 2023-07-25

## 2023-07-25 NOTE — PRE-PROCEDURE INSTRUCTIONS
IMPORTANT INSTRUCTIONS - PRE-ADMISSION TESTING  DO NOT EAT OR CHEW anything after midnight the night before your procedure.    You may have CLEAR liquids up to 2 hours prior to ARRIVAL time.   Take the following medications the morning of your procedure with JUST A SIP OF WATER: ZOFRAN IF NEEDED, HYDROCODONE IF NEEDED AND FLOMAX    DO NOT BRING your medications to the hospital with you, UNLESS something has changed since your PRE-Admission Testing appointment.  Hold all vitamins, supplements, and NSAIDS (Non- steroidal anti-inflammatory meds) for one week prior to surgery (you MAY take Tylenol or Acetaminophen).  If you are diabetic, check your blood sugar the morning of your procedure. If it is less than 70 or if you are feeling symptomatic, call the following number for further instructions: [phone]  Use your inhalers/nebulizers as usual, the morning of your procedure. BRING YOUR INHALERS with you.   Bring your CPAP or BIPAP to hospital, ONLY IF YOU WILL BE SPENDING THE NIGHT.   Make sure you have a ride home and have someone who will stay with you the day of your procedure after you go home.  If you have any questions, please call your Pre-Admission Testing NurseIRMA at [phone].   Per anesthesia request, do not smoke for 24 hours before your procedure or as instructed by your surgeon.  Clear Liquid Diet        Find out when you need to start a clear liquid diet.   Think of “clear liquids” as anything you could read a newspaper through. This includes things like water, broth, sports drinks, or tea WITHOUT any kind of milk or cream.           Once you are told to start a clear liquid diet, only drink these things until 2 hours before arrival to the hospital or when the hospital says to stop. Total volume limitation: 8 oz.       Clear liquids you CAN drink:   Water   Clear broth: beef, chicken, vegetable, or bone broth with nothing in it   Gatorade   Lemonade or Don-aid   Soda   Tea, coffee (NO  cream or honey)   Jell-O (without fruit)   Popsicles (without fruit or cream)   Italian ices   Juice without pulp: apple, white, grape   You may use salt, pepper, and sugar    Do NOT drink:   Milk or cream   Soy milk, almond milk, coconut milk, or other non-dairy drinks and   creamers   Milkshakes or smoothies   Tomato juice   Orange juice   Grapefruit juice   Cream soups or any other than broth         Clear Liquid Diet:  Do NOT eat any solid food.  Do NOT eat or suck on mints or candy.  Do NOT chew gum.  Do NOT drink thick liquids like milk or juice with pulp in it.  Do NOT add milk, cream, or anything like soy milk or almond milk to coffee or tea.

## 2023-07-25 NOTE — PROGRESS NOTES
Chief Complaint    Urologic complaint    Subjective          Paul Almendarez presents to [facility] UROLOGY  History of Present Illness    52-year-old gentleman     Nephrolithiasis  Ureteral stone    Moderate pain currently.  Right side.    7/21/2023 CT abdomen/pelvis without - 7 mm distal right ureteral stone.  Severe right hydro.  No stones in the right kidney.  4 mm and 2 mm stone left kidney nonobstructing  No change in 7 mm pulmonary nodule.   Images reviewed    Patient has had a lung nodule for many years, they are no longer following    7/21/2023 UA-small blood negative otherwise, no bacteria  7/21/2023 1.5, GFR 55      10 stones, 5 lithotripsies    No cardiopulmonary history, non-smoker, no anticoagulation    Previous      6/8/2022 left ureteroscopy - string left on - all stones removed  6/22 calcium oxalate monohydrate 40, calcium oxalate dihydrate 40, calcium phosphate 20        6/3/22 CT abdomen/pelvis without  -  5 x 6 x 9 mm proximal left ureter.  2 -  3 mm stones in the left kidney, punctate stones in the right kidney.  Images reviewed  6/1/2022 UA-moderate blood, negative otherwise  5/22 1.1, GFR >60     9/23/2021 right ureteroscopy with laser stent with no string -2.5 cm prostate.  1.8 cm right ureteral stone.  Removed in its entirety no other stones  10/21  Ca Oxalate - Monohydrate 40, calcium oxalate dihydrate 30, hydroxyapatite 30    9/4/2021 bilateral ureter[facility], left stone removal, [facility] on the left, no mention of removal of nonobstructing left renal stones in the operative note.  Unable to identify the right stone because of a tortuous ureter, right stent was placed    9/4/2021 CT-abd/pelvis without -left kidney with a 5 mm mid ureteral stone, 3 nonobstructing stones in the left kidney 5 mm, 4 mm and 2 mm.  Right kidney has a 2 mm stone in the kidney nonobstructing and a 1.4 x 1.2 proximal ureteral stone      9/21 creatinine  1.1, GFR 68     6/14/2019 [place] 2 left ureteral stones one is about 4 mm and one is about 7 mm.  Also a 3 mm nonobstructing left renal stone.  7 mm right nonobstructing renal stone.                  Past History:  Medical History: has a past medical history of Hypertension, Kidney stone, and Kidney stones.   Surgical History: has a past surgical history that includes Kidney stone surgery; ureteroscopy laser lithotripsy with stent insertion (Bilateral, 9/4/2021); cystoscopy ureteroscopy (Right, 9/23/2021); and cystoscopy ureteroscopy (Left, 6/8/2022).   Family History: family history is not on file.   Social History: reports that he quit smoking about 7 years ago. His smoking use included cigarettes. He has never used smokeless tobacco. He reports that he does not currently use alcohol. He reports that he does not use drugs.  Allergies: Iodine       Current Outpatient Medications:     amLODIPine (NORVASC) 5 MG tablet, Take 1 tablet by mouth Daily., Disp: 90 tablet, Rfl: 3    atorvastatin (LIPITOR) 20 MG tablet, Take 1 tablet by mouth Daily., Disp: 90 tablet, Rfl: 0    fenofibrate (TRICOR) 145 MG tablet, Take 1 tablet by mouth Daily., Disp: 90 tablet, Rfl: 3    HYDROcodone-acetaminophen (NORCO) 5-325 MG per tablet, Take 1 tablet by mouth Every 6 (Six) Hours As Needed for Moderate Pain., Disp: 6 tablet, Rfl: 0    olmesartan (Benicar) 40 MG tablet, Take 1 tablet by mouth Daily for 90 days., Disp: 90 tablet, Rfl: 3    ondansetron ODT (ZOFRAN-ODT) 4 MG disintegrating tablet, Place 1 tablet on the tongue 4 (Four) Times a Day As Needed for Nausea or Vomiting., Disp: 9 tablet, Rfl: 0    tamsulosin (FLOMAX) 0.4 MG capsule 24 hr capsule, Take 1 capsule by mouth Daily., Disp: 10 capsule, Rfl: 0              Objective     Vital Signs:   There were no vitals taken for this visit.             Assessment and Plan    Diagnoses and all orders for this visit:    1. Nephrolithiasis  (Primary)      CT images and read reviewed and discussed with the patient    Records reviewed and summarized in chart    Patient with a 7 mm distal right ureteral stone.   We will plan on cystoscopy with right ureteroscopy with laser and right ureteral stent placement.  Risks and benefits were discussed including bleeding, infection and damage to the urinary system.  We also discussed the risk of anesthesia up to and including death.  Patient voiced understanding and would like to proceed.    Patient understands he has fever greater than 101, intractable nausea/vomiting intractable pain he should go to emergency room

## 2023-07-27 ENCOUNTER — ANESTHESIA (OUTPATIENT)
Dept: PERIOP | Facility: HOSPITAL | Age: 53
End: 2023-07-27
Payer: OTHER GOVERNMENT

## 2023-07-27 ENCOUNTER — HOSPITAL ENCOUNTER (OUTPATIENT)
Facility: HOSPITAL | Age: 53
Discharge: HOME | End: 2023-07-27
Attending: UROLOGY | Admitting: UROLOGY
Payer: OTHER GOVERNMENT

## 2023-07-27 ENCOUNTER — ANESTHESIA EVENT (OUTPATIENT)
Dept: PERIOP | Facility: HOSPITAL | Age: 53
End: 2023-07-27
Payer: OTHER GOVERNMENT

## 2023-07-27 ENCOUNTER — APPOINTMENT (OUTPATIENT)
Dept: GENERAL RADIOLOGY | Facility: HOSPITAL | Age: 53
End: 2023-07-27
Payer: OTHER GOVERNMENT

## 2023-07-27 VITALS
RESPIRATION RATE: 16 BRPM | BODY MASS INDEX: 37 KG/M2 | TEMPERATURE: 97.1 F | OXYGEN SATURATION: 98 % | HEART RATE: 90 BPM | DIASTOLIC BLOOD PRESSURE: 67 MMHG | HEIGHT: 69 IN | WEIGHT: 249.78 LBS | SYSTOLIC BLOOD PRESSURE: 110 MMHG

## 2023-07-27 DIAGNOSIS — N20.1 URETERAL STONE: ICD-10-CM

## 2023-07-27 PROCEDURE — 93010 ELECTROCARDIOGRAM REPORT: CPT | Performed by: INTERNAL MEDICINE

## 2023-07-27 PROCEDURE — 25010000002 MIDAZOLAM PER 1MG: Performed by: ANESTHESIOLOGY

## 2023-07-27 PROCEDURE — 25010000002 ONDANSETRON PER 1 MG: Performed by: NURSE ANESTHETIST, CERTIFIED REGISTERED

## 2023-07-27 PROCEDURE — 88300 SURGICAL PATH GROSS: CPT | Performed by: UROLOGY

## 2023-07-27 PROCEDURE — 76000 FLUOROSCOPY <1 HR PHYS/QHP: CPT

## 2023-07-27 PROCEDURE — C1769 GUIDE WIRE: HCPCS | Performed by: UROLOGY

## 2023-07-27 PROCEDURE — C2617 STENT, NON-COR, TEM W/O DEL: HCPCS | Performed by: UROLOGY

## 2023-07-27 PROCEDURE — 25010000002 PROPOFOL 10 MG/ML EMULSION: Performed by: NURSE ANESTHETIST, CERTIFIED REGISTERED

## 2023-07-27 PROCEDURE — 52352 CYSTOURETERO W/STONE REMOVE: CPT | Performed by: UROLOGY

## 2023-07-27 PROCEDURE — 93005 ELECTROCARDIOGRAM TRACING: CPT | Performed by: UROLOGY

## 2023-07-27 PROCEDURE — 52332 CYSTOSCOPY AND TREATMENT: CPT | Performed by: UROLOGY

## 2023-07-27 PROCEDURE — 25010000002 LEVOFLOXACIN PER 250 MG: Performed by: UROLOGY

## 2023-07-27 PROCEDURE — 25010000002 SUGAMMADEX 200 MG/2ML SOLUTION: Performed by: NURSE ANESTHETIST, CERTIFIED REGISTERED

## 2023-07-27 PROCEDURE — 25010000002 DEXAMETHASONE PER 1 MG: Performed by: NURSE ANESTHETIST, CERTIFIED REGISTERED

## 2023-07-27 PROCEDURE — 82365 CALCULUS SPECTROSCOPY: CPT | Performed by: UROLOGY

## 2023-07-27 PROCEDURE — 25010000002 FENTANYL CITRATE (PF) 50 MCG/ML SOLUTION: Performed by: NURSE ANESTHETIST, CERTIFIED REGISTERED

## 2023-07-27 DEVICE — STNT URETRL CLASSC DBL PIG 6F 26CM: Type: IMPLANTABLE DEVICE | Site: URETER | Status: FUNCTIONAL

## 2023-07-27 RX ORDER — SODIUM CHLORIDE 0.9 % (FLUSH) 0.9 %
10 SYRINGE (ML) INJECTION AS NEEDED
Status: DISCONTINUED | OUTPATIENT
Start: 2023-07-27 | End: 2023-07-27 | Stop reason: HOSPADM

## 2023-07-27 RX ORDER — OXYCODONE HYDROCHLORIDE AND ACETAMINOPHEN 5; 325 MG/1; MG/1
1 TABLET ORAL EVERY 4 HOURS PRN
Status: DISCONTINUED | OUTPATIENT
Start: 2023-07-27 | End: 2023-07-27 | Stop reason: HOSPADM

## 2023-07-27 RX ORDER — ACETAMINOPHEN 500 MG
1000 TABLET ORAL ONCE
Status: COMPLETED | OUTPATIENT
Start: 2023-07-27 | End: 2023-07-27

## 2023-07-27 RX ORDER — SODIUM CHLORIDE 9 MG/ML
40 INJECTION, SOLUTION INTRAVENOUS AS NEEDED
Status: DISCONTINUED | OUTPATIENT
Start: 2023-07-27 | End: 2023-07-27 | Stop reason: HOSPADM

## 2023-07-27 RX ORDER — HYDROCODONE BITARTRATE AND ACETAMINOPHEN 5; 325 MG/1; MG/1
1 TABLET ORAL EVERY 6 HOURS PRN
Qty: 12 TABLET | Refills: 0 | Status: SHIPPED | OUTPATIENT
Start: 2023-07-27

## 2023-07-27 RX ORDER — ONDANSETRON 2 MG/ML
4 INJECTION INTRAMUSCULAR; INTRAVENOUS ONCE AS NEEDED
Status: DISCONTINUED | OUTPATIENT
Start: 2023-07-27 | End: 2023-07-27 | Stop reason: SDUPTHER

## 2023-07-27 RX ORDER — LEVOFLOXACIN 5 MG/ML
500 INJECTION, SOLUTION INTRAVENOUS ONCE
Status: COMPLETED | OUTPATIENT
Start: 2023-07-27 | End: 2023-07-27

## 2023-07-27 RX ORDER — FENTANYL CITRATE 50 UG/ML
INJECTION, SOLUTION INTRAMUSCULAR; INTRAVENOUS AS NEEDED
Status: DISCONTINUED | OUTPATIENT
Start: 2023-07-27 | End: 2023-07-27

## 2023-07-27 RX ORDER — PROMETHAZINE HYDROCHLORIDE 12.5 MG/1
12.5 TABLET ORAL ONCE AS NEEDED
Status: DISCONTINUED | OUTPATIENT
Start: 2023-07-27 | End: 2023-07-27 | Stop reason: HOSPADM

## 2023-07-27 RX ORDER — LIDOCAINE HYDROCHLORIDE 20 MG/ML
INJECTION, SOLUTION EPIDURAL; INFILTRATION; INTRACAUDAL; PERINEURAL AS NEEDED
Status: DISCONTINUED | OUTPATIENT
Start: 2023-07-27 | End: 2023-07-27 | Stop reason: SURG

## 2023-07-27 RX ORDER — MEPERIDINE HYDROCHLORIDE 25 MG/ML
12.5 INJECTION INTRAMUSCULAR; INTRAVENOUS; SUBCUTANEOUS
Status: DISCONTINUED | OUTPATIENT
Start: 2023-07-27 | End: 2023-07-27 | Stop reason: SDUPTHER

## 2023-07-27 RX ORDER — OXYCODONE HCL 5 MG/5 ML
5 SOLUTION, ORAL ORAL
Status: DISCONTINUED | OUTPATIENT
Start: 2023-07-27 | End: 2023-07-27 | Stop reason: HOSPADM

## 2023-07-27 RX ORDER — PROMETHAZINE HYDROCHLORIDE 12.5 MG/1
25 TABLET ORAL ONCE AS NEEDED
Status: DISCONTINUED | OUTPATIENT
Start: 2023-07-27 | End: 2023-07-27 | Stop reason: SDUPTHER

## 2023-07-27 RX ORDER — PROMETHAZINE HYDROCHLORIDE 25 MG/1
25 SUPPOSITORY RECTAL ONCE AS NEEDED
Status: DISCONTINUED | OUTPATIENT
Start: 2023-07-27 | End: 2023-07-27 | Stop reason: SDUPTHER

## 2023-07-27 RX ORDER — SODIUM CHLORIDE, SODIUM LACTATE, POTASSIUM CHLORIDE, CALCIUM CHLORIDE 600; 310; 30; 20 MG/100ML; MG/100ML; MG/100ML; MG/100ML
9 INJECTION, SOLUTION INTRAVENOUS CONTINUOUS PRN
Status: DISCONTINUED | OUTPATIENT
Start: 2023-07-27 | End: 2023-07-27 | Stop reason: HOSPADM

## 2023-07-27 RX ORDER — SODIUM CHLORIDE 9 MG/ML
100 INJECTION, SOLUTION INTRAVENOUS CONTINUOUS
Status: DISCONTINUED | OUTPATIENT
Start: 2023-07-27 | End: 2023-07-27 | Stop reason: HOSPADM

## 2023-07-27 RX ORDER — ONDANSETRON 2 MG/ML
INJECTION INTRAMUSCULAR; INTRAVENOUS AS NEEDED
Status: DISCONTINUED | OUTPATIENT
Start: 2023-07-27 | End: 2023-07-27 | Stop reason: SURG

## 2023-07-27 RX ORDER — MIDAZOLAM HYDROCHLORIDE 2 MG/2ML
2 INJECTION, SOLUTION INTRAMUSCULAR; INTRAVENOUS ONCE
Status: COMPLETED | OUTPATIENT
Start: 2023-07-27 | End: 2023-07-27

## 2023-07-27 RX ORDER — PROPOFOL 10 MG/ML
VIAL (ML) INTRAVENOUS AS NEEDED
Status: DISCONTINUED | OUTPATIENT
Start: 2023-07-27 | End: 2023-07-27 | Stop reason: SURG

## 2023-07-27 RX ORDER — DEXAMETHASONE SODIUM PHOSPHATE 4 MG/ML
INJECTION, SOLUTION INTRA-ARTICULAR; INTRALESIONAL; INTRAMUSCULAR; INTRAVENOUS; SOFT TISSUE AS NEEDED
Status: DISCONTINUED | OUTPATIENT
Start: 2023-07-27 | End: 2023-07-27 | Stop reason: SURG

## 2023-07-27 RX ORDER — ACETAMINOPHEN 325 MG/1
650 TABLET ORAL ONCE
Status: DISCONTINUED | OUTPATIENT
Start: 2023-07-27 | End: 2023-07-27 | Stop reason: HOSPADM

## 2023-07-27 RX ORDER — ROCURONIUM BROMIDE 10 MG/ML
INJECTION, SOLUTION INTRAVENOUS AS NEEDED
Status: DISCONTINUED | OUTPATIENT
Start: 2023-07-27 | End: 2023-07-27 | Stop reason: SURG

## 2023-07-27 RX ORDER — PHENYLEPHRINE HCL IN 0.9% NACL 1 MG/10 ML
SYRINGE (ML) INTRAVENOUS AS NEEDED
Status: DISCONTINUED | OUTPATIENT
Start: 2023-07-27 | End: 2023-07-27 | Stop reason: SURG

## 2023-07-27 RX ORDER — HYDROCODONE BITARTRATE AND ACETAMINOPHEN 5; 325 MG/1; MG/1
1 TABLET ORAL ONCE AS NEEDED
Status: DISCONTINUED | OUTPATIENT
Start: 2023-07-27 | End: 2023-07-27 | Stop reason: HOSPADM

## 2023-07-27 RX ORDER — ONDANSETRON 4 MG/1
4 TABLET, FILM COATED ORAL ONCE AS NEEDED
Status: DISCONTINUED | OUTPATIENT
Start: 2023-07-27 | End: 2023-07-27 | Stop reason: HOSPADM

## 2023-07-27 RX ORDER — PROMETHAZINE HYDROCHLORIDE 25 MG/1
25 SUPPOSITORY RECTAL ONCE AS NEEDED
Status: DISCONTINUED | OUTPATIENT
Start: 2023-07-27 | End: 2023-07-27 | Stop reason: HOSPADM

## 2023-07-27 RX ORDER — ONDANSETRON 2 MG/ML
4 INJECTION INTRAMUSCULAR; INTRAVENOUS ONCE AS NEEDED
Status: DISCONTINUED | OUTPATIENT
Start: 2023-07-27 | End: 2023-07-27 | Stop reason: HOSPADM

## 2023-07-27 RX ORDER — MAGNESIUM HYDROXIDE 1200 MG/15ML
LIQUID ORAL AS NEEDED
Status: DISCONTINUED | OUTPATIENT
Start: 2023-07-27 | End: 2023-07-27 | Stop reason: HOSPADM

## 2023-07-27 RX ORDER — PROMETHAZINE HYDROCHLORIDE 12.5 MG/1
25 TABLET ORAL ONCE AS NEEDED
Status: DISCONTINUED | OUTPATIENT
Start: 2023-07-27 | End: 2023-07-27 | Stop reason: HOSPADM

## 2023-07-27 RX ORDER — SUCCINYLCHOLINE/SOD CL,ISO/PF 100 MG/5ML
SYRINGE (ML) INTRAVENOUS AS NEEDED
Status: DISCONTINUED | OUTPATIENT
Start: 2023-07-27 | End: 2023-07-27 | Stop reason: SURG

## 2023-07-27 RX ORDER — SODIUM CHLORIDE 0.9 % (FLUSH) 0.9 %
3 SYRINGE (ML) INJECTION EVERY 12 HOURS SCHEDULED
Status: DISCONTINUED | OUTPATIENT
Start: 2023-07-27 | End: 2023-07-27 | Stop reason: HOSPADM

## 2023-07-27 RX ORDER — SODIUM CHLORIDE 9 MG/ML
INJECTION, SOLUTION INTRAVENOUS CONTINUOUS PRN
Status: DISCONTINUED | OUTPATIENT
Start: 2023-07-27 | End: 2023-07-27 | Stop reason: SURG

## 2023-07-27 RX ORDER — MEPERIDINE HYDROCHLORIDE 25 MG/ML
12.5 INJECTION INTRAMUSCULAR; INTRAVENOUS; SUBCUTANEOUS
Status: DISCONTINUED | OUTPATIENT
Start: 2023-07-27 | End: 2023-07-27 | Stop reason: HOSPADM

## 2023-07-27 RX ADMIN — Medication 200 MCG: at 13:05

## 2023-07-27 RX ADMIN — PROPOFOL 200 MG: 10 INJECTION, EMULSION INTRAVENOUS at 12:52

## 2023-07-27 RX ADMIN — SODIUM CHLORIDE, POTASSIUM CHLORIDE, SODIUM LACTATE AND CALCIUM CHLORIDE 9 ML/HR: 600; 310; 30; 20 INJECTION, SOLUTION INTRAVENOUS at 10:25

## 2023-07-27 RX ADMIN — FENTANYL CITRATE 25 MCG: 50 INJECTION, SOLUTION INTRAMUSCULAR; INTRAVENOUS at 12:58

## 2023-07-27 RX ADMIN — Medication 200 MCG: at 13:08

## 2023-07-27 RX ADMIN — MIDAZOLAM HYDROCHLORIDE 2 MG: 1 INJECTION, SOLUTION INTRAMUSCULAR; INTRAVENOUS at 11:18

## 2023-07-27 RX ADMIN — ONDANSETRON 4 MG: 2 INJECTION INTRAMUSCULAR; INTRAVENOUS at 13:20

## 2023-07-27 RX ADMIN — Medication 300 MCG: at 13:18

## 2023-07-27 RX ADMIN — LEVOFLOXACIN 500 MG: 500 INJECTION, SOLUTION INTRAVENOUS at 12:48

## 2023-07-27 RX ADMIN — ROCURONIUM BROMIDE 5 MG: 10 SOLUTION INTRAVENOUS at 12:52

## 2023-07-27 RX ADMIN — SUGAMMADEX 200 MG: 100 INJECTION, SOLUTION INTRAVENOUS at 13:17

## 2023-07-27 RX ADMIN — DEXAMETHASONE SODIUM PHOSPHATE 4 MG: 4 INJECTION, SOLUTION INTRAMUSCULAR; INTRAVENOUS at 13:20

## 2023-07-27 RX ADMIN — ACETAMINOPHEN 1000 MG: 500 TABLET ORAL at 10:24

## 2023-07-27 RX ADMIN — SODIUM CHLORIDE: 9 INJECTION, SOLUTION INTRAVENOUS at 12:42

## 2023-07-27 RX ADMIN — Medication 100 MCG: at 13:03

## 2023-07-27 RX ADMIN — LIDOCAINE HYDROCHLORIDE 80 MG: 20 INJECTION, SOLUTION EPIDURAL; INFILTRATION; INTRACAUDAL; PERINEURAL at 12:52

## 2023-07-27 RX ADMIN — Medication 100 MCG: at 13:00

## 2023-07-27 RX ADMIN — FENTANYL CITRATE 50 MCG: 50 INJECTION, SOLUTION INTRAMUSCULAR; INTRAVENOUS at 12:52

## 2023-07-27 RX ADMIN — ROCURONIUM BROMIDE 45 MG: 10 SOLUTION INTRAVENOUS at 13:00

## 2023-07-27 RX ADMIN — Medication 200 MCG: at 13:14

## 2023-07-27 RX ADMIN — Medication 140 MG: at 12:52

## 2023-07-27 NOTE — ANESTHESIA POSTPROCEDURE EVALUATION
Patient: Paul Almendarez    Procedure Summary       Date: 07/27/23 Room / Location: [facility] OR 02 / [facility] MAIN OR    Anesthesia Start: 1246 Anesthesia Stop: 1333    Procedure: Cystoscopy with right ureteroscopy, stone extraction, right ureteral stent placement. (Right) Diagnosis:       Ureteral stone      (Ureteral stone [N20.1])    Surgeons: Gwyn Azul MD Provider: Jaden Elder MD    Anesthesia Type: general ASA Status: 2            Anesthesia Type: general    Vitals  Vitals Value Taken Time   /64 07/27/23 1348   Temp 35.9 °C (96.7 °F) 07/27/23 1328   Pulse 92 07/27/23 1349   Resp 18 07/27/23 1348   SpO2 96 % 07/27/23 1349   Vitals shown include unvalidated device data.        Post Anesthesia Care and Evaluation    Patient location during evaluation: bedside  Patient participation: complete - patient participated  Level of consciousness: awake  Pain score: 2  Pain management: adequate    Airway patency: patent  PONV Status: none  Cardiovascular status: acceptable and stable  Respiratory status: acceptable  Hydration status: acceptable    Comments: An Anesthesiologist personally participated in the most demanding procedures (including induction and emergence if applicable) in the anesthesia plan, monitored the course of anesthesia administration at frequent intervals and remained physically present and available for immediate diagnosis and treatment of emergencies.

## 2023-07-27 NOTE — DISCHARGE INSTRUCTIONS
DISCHARGE INSTRUCTIONS  Extracorporeal Shock Wave Lithotripsy (ESWL)/ Ureteroscopy Lasertripsy      For your surgery you had:  General anesthesia (you may have a sore throat for the first 24 hours)  You may experience dizziness, drowsiness, or lightheadedness for several hours following surgery.  Do not stay alone today or tonight.  Limit your activity for 24 hours.  You should not drive or operate machinery, drink alcohol, or sign legally binding documents for 24 hours or while you are taking pain medication.  Resume your diet slowly.  Follow any special dietary instructions you may have been given by your doctor.     NOTIFY YOUR DOCTOR IF YOU EXPERIENCE ANY OF THE FOLLOWING:  Temperature greater than 101 degrees Fahrenheit  Shaking Chills  Redness or excessive drainage from incision  Nausea, vomiting and/or pain that is not controlled by prescribed medications  Increase in bleeding or bleeding that is excessive  Unable to urinate in 6 hours after surgery  If unable to reach your doctor, please go to the closest Emergency Room  Strain urine if instructed by physician.  Collect any fragments and take with you on your scheduled appointment. You may pass stone pieces or small blood clots.  Blood in your urine is normal.  It could be light pink to cherry color.  Drink 6-8 glasses of fluid each day to assist with passing of stone fragments.  Back pain is common.  It may feel like a dull ache or back spasm.  Urine will be bloody for several days.  Slight redness or bruising may be noticed on treated side.  If you have difficulty urinating, try sitting in a bathtub of warm water.    If you have a stent, it must be managed by your urologist.  Do NOT forget.      SPECIAL INSTRUCTIONS:      Last dose of pain medication was given at:

## 2023-07-27 NOTE — ANESTHESIA PREPROCEDURE EVALUATION
Anesthesia Evaluation     Patient summary reviewed and Nursing notes reviewed   no history of anesthetic complications:   NPO Solid Status: > 8 hours  NPO Liquid Status: > 2 hours           Airway   Mallampati: II  TM distance: >3 FB  Neck ROM: full  No difficulty expected and Large neck circumference  Dental      Pulmonary - normal exam    breath sounds clear to auscultation  (+) a smoker Former,sleep apnea  Cardiovascular - negative cardio ROS and normal exam  Exercise tolerance: good (4-7 METS)    Rhythm: regular  Rate: normal    (+) hypertensionhyperlipidemia      Neuro/Psych- negative ROS  GI/Hepatic/Renal/Endo - negative ROS   (+) renal disease stones    Musculoskeletal (-) negative ROS    Abdominal    Substance History - negative use     OB/GYN negative ob/gyn ROS         Other - negative ROS       ROS/Med Hx Other: PAT Nursing Notes unavailable.                 Anesthesia Plan    ASA 2     general     (Patient understands anesthesia not responsible for dental damage.)  intravenous induction     Anesthetic plan, risks, benefits, and alternatives have been provided, discussed and informed consent has been obtained with: patient.    Use of blood products discussed with patient .    Plan discussed with CRNA.    CODE STATUS:

## 2023-07-27 NOTE — OP NOTE
URETEROSCOPY LASER LITHOTRIPSY WITH STENT INSERTION  Procedure Report    Patient Name:  Paul Almendarez  YOB: 1970    Date of Surgery:  7/27/2023      Pre-op Diagnosis:   Ureteral stone [N20.1]       Postop diagnosis:    Same    Procedure/CPT® Codes:      Procedure(s):      Cystoscopy    right ureteroscopy, basket stone extraction  right ureteral stent placement 626 string left on    Staff:  Surgeon(s):  Gwyn Azul MD         Anesthesia: General    Estimated Blood Loss: none    Implants:    Implant Name Type Inv. Item Serial No.  Lot No. LRB No. Used Action   STNT URETRL CLASSC DBL PIG 6F 26CM - [id number] Stent STNT URETRL CLASSC DBL PIG 6F 26CM  [facility] [id number] Right 1 Implanted       Specimen:          Specimens       ID Source Type Tests Collected By Collected At Frozen?    A Ureter, Right Calculus TISSUE PATHOLOGY EXAM  STONE ANALYSIS   Gwyn Azul MD 7/27/23 1312     Description: right ureteral stone    This specimen was not marked as sent.                Findings:     4 cm prostate    Normal bladder    Stone was removed its entirety.    No stones left in the right collecting system.      Stent placed with string    Complications: none    Description of Procedure:     After informed consent patient taken to the operating room.  Patient was laid supine and placed under general anesthesia by the anesthesia team.  At this point patient was placed in dorsal lithotomy position and prepped and draped in normal sterile fashion.  A multidisciplinary timeout was undertaken documenting the correct patient site and procedure.  At this point a 22 rigid cystoscope was placed into the urethra . Bladder was examined.    Stone could be seen in the ureteral orifice.  Glidewire was placed up the right ureter under fluoroscopic guidance.    Basket was used to basket the stone out 1 piece without issue.    Took a semirigid ureteroscope up the right ureter about  retirement to make sure there is no further fragments.  Everything was good no further stones    Right side was free of stones.  A 6 x 26 ureteral stent was then placed over the Glidewire through a rigid cystoscope without issue and had a good curl in the bladder under direct vision and a good curl in the right     renal pelvis under fluoroscopy.  Bladder was drained.  Patient tolerated the procedure well, he was taken to the postanesthesia care unit without issue.      String left on stent        Gwyn Azul MD     Date: 7/27/2023  Time: 13:15 EDT

## 2023-07-27 NOTE — H&P
[facility]   UROLOGY HISTORY AND PHYSICAL    Patient Name: Paul Almendarez  : 1970  MRN: [id number]  Primary Care Physician:  Andrade Johnson DO  Date of admission: 2023    Subjective   Subjective     Chief Complaint:    Right ureteral stone    HPI:    Paul Almendarez is a 52 y.o. male     Right ureteral stone    No change in H&P    Review of Systems     10 systems reviewed and are negative other than what is listed in HPI    Personal History     Past Medical History:   Diagnosis Date    Hyperlipidemia     Hypertension     Kidney stone        Past Surgical History:   Procedure Laterality Date    CYSTOSCOPY URETEROSCOPY Right 2021    Procedure: CYSTOSCOPY, RIGHT URETEROSCOPY, LASERTRIPSY, STONE BASKET EXTRACTION AND STENT EXCHANGE;  Surgeon: Gwyn Azul MD;  Location: [facility];  Service: Urology;  Laterality: Right;    CYSTOSCOPY URETEROSCOPY Left 2022    Procedure: Cystoscopy with left ureteroscopy with laser and left ureteral stent placement;  Surgeon: Gwyn Azul MD;  Location: [facility];  Service: Urology;  Laterality: Left;    KIDNEY STONE SURGERY      URETEROSCOPY LASER LITHOTRIPSY WITH STENT INSERTION Bilateral 2021    Procedure: CYSTOSCOPY, BILATERAL URETEROSCOPY  AND  LEFT LASER LITHOTRIPSY WITH STONE REMOVAL ,  RIGHT RETROGRADE AND RIGHT URETERAL  STENT INSERTION;  Surgeon: Andre Majano MD;  Location: [facility];  Service: Urology;  Laterality: Bilateral;       Family History: family history is not on file. Otherwise pertinent FHx was reviewed and not pertinent to current issue.    Social History:  reports that he quit smoking about 7 years ago. His smoking use included cigarettes. He has never used smokeless tobacco. He reports that he does not currently use alcohol. He reports that he does not use drugs.    Home Medications:  HYDROcodone-acetaminophen, amLODIPine, atorvastatin, fenofibrate, olmesartan, ondansetron ODT,  and tamsulosin      Allergies:  Allergies   Allergen Reactions    Iodine Hives, Shortness Of Breath and Swelling       Objective   Objective     Vitals:   Temp:  [99 °F (37.2 °C)] 99 °F (37.2 °C)  Heart Rate:  [102] 102  Resp:  [20] 20  BP: (123)/(72) 123/72  Physical Exam    Constitutional: Awake, alert    Respiratory: Clear to auscultation bilaterally, nonlabored respirations    Cardiovascular: RRR, no murmurs, rubs, or gallops, palpable pedal pulses bilaterally   Gastrointestinal: Positive bowel sounds, soft, nontender, nondistended   Result Review    Result Review:  I have personally reviewed the results from the time of this admission to 7/27/2023 09:48 EDT and agree with these findings:  []  Laboratory  []  Microbiology  []  Radiology  []  EKG/Telemetry   []  Cardiology/Vascular   []  Pathology  []  Old records  []  Other:    Assessment & Plan   Assessment / Plan     Brief Patient Summary:  Paul Almendarez is a 52 y.o. male     Active Hospital Problems:  Active Hospital Problems    Diagnosis     **Ureteral stone     Nephrolithiasis        Plan:     Cystoscopy with right ureteroscopy with laser and right ureteral stent exchange risks and benefits were discussed including bleeding, infection and damage to the urinary system.  We also discussed the risk of anesthesia up to and including death.  Patient voiced understanding and would like to proceed.      Electronically signed by Gwyn Azul MD, 07/27/23, 9:48 AM EDT.

## 2023-07-29 LAB — QT INTERVAL: 322 MS

## 2023-08-03 LAB
CALCIUM OXALATE DIHYDRATE MFR STONE IR: 80 %
COLOR STONE: NORMAL
COM MFR STONE: 15 %
COMPN STONE: NORMAL
HYDROXYAPATITE: 5 %
LABORATORY COMMENT REPORT: NORMAL
LABORATORY COMMENT REPORT: NORMAL
Lab: NORMAL
Lab: NORMAL
PHOTO: NORMAL
SIZE STONE: NORMAL MM
SPEC SOURCE SUBJ: NORMAL
STONE ANALYSIS-IMP: NORMAL
WT STONE: 45 MG

## 2023-08-08 ENCOUNTER — OFFICE VISIT (OUTPATIENT)
Dept: SLEEP MEDICINE | Facility: HOSPITAL | Age: 53
End: 2023-08-08
Payer: OTHER GOVERNMENT

## 2023-08-08 VITALS
BODY MASS INDEX: 37.47 KG/M2 | HEIGHT: 69 IN | HEART RATE: 78 BPM | DIASTOLIC BLOOD PRESSURE: 72 MMHG | OXYGEN SATURATION: 99 % | WEIGHT: 253 LBS | SYSTOLIC BLOOD PRESSURE: 127 MMHG

## 2023-08-08 DIAGNOSIS — Z99.89 OSA ON CPAP: Primary | ICD-10-CM

## 2023-08-08 DIAGNOSIS — E78.1 HYPERTRIGLYCERIDEMIA: ICD-10-CM

## 2023-08-08 DIAGNOSIS — G47.33 OSA ON CPAP: Primary | ICD-10-CM

## 2023-08-08 DIAGNOSIS — I10 ESSENTIAL HYPERTENSION: ICD-10-CM

## 2023-08-08 DIAGNOSIS — G47.34 SLEEP RELATED HYPOXIA: ICD-10-CM

## 2023-08-08 DIAGNOSIS — E66.9 OBESITY (BMI 30-39.9): ICD-10-CM

## 2023-08-08 PROCEDURE — G0463 HOSPITAL OUTPT CLINIC VISIT: HCPCS

## 2023-08-08 NOTE — PROGRESS NOTES
Sleep Disorder Follow Up    Patient Name: Paul Almendarez  Age/Sex: 52 y.o. male  : 1970  MRN: [id number]    Date of Encounter Visit: 23   Referring Provider: Maximo Brower MD  Place of Service: [facility]  Patient Care Team:  Andrade Johnson DO as PCP - General (Family Medicine)  Gwyn Azul MD as Consulting Physician (Urology)    PROBLEM LIST:  RIYA    History of Present Illness:  Paul Almendarez is a 52 y.o. male who is here for follow up on RIYA. Patient was last seen in the office on 3/21/2023.  Since last visit, patient was referred for a home sleep study That was done on 2023 and was positive for very severe RIYA with AHI of 77 along with critical sleep-related hypoxemia with desaturation in the 60s and auto CPAP 8-20 was ordered   Patient uses machine every night with no complaints from the mask or the pressure.  Patient uses a nasal pillow mask, which does fit well. Patient denies any significant air leak.no dry mouth.   Patient sleeps better and has a deeper sleep with the machine and feels more energy during the day time.  Currently on auto CPAP  8-20 cm H20.  Baxter Sleepiness Scale (ESS) is 4.  Compliance download was reviewed and documented below.  Weight is up by 9 pounds since last visit.  Other comorbidities include hypertension and obesity    Review of Systems:   A ten-system review was conducted and was negative except for the following: Nasal congestion and morning headache.        Past Medical History:  Past medical, surgical, social, and family history, except as mentioned above, was unchanged from the last visit.     Past Medical History:   Diagnosis Date    Hyperlipidemia     Hypertension     Kidney stone        Past Surgical History:   Procedure Laterality Date    CYSTOSCOPY URETEROSCOPY Right 2021    Procedure: CYSTOSCOPY, RIGHT URETEROSCOPY, LASERTRIPSY, STONE BASKET EXTRACTION AND STENT EXCHANGE;  Surgeon:  Gwyn Azul MD;  Location: [facility] OR;  Service: Urology;  Laterality: Right;    CYSTOSCOPY URETEROSCOPY Left 6/8/2022    Procedure: Cystoscopy with left ureteroscopy with laser and left ureteral stent placement;  Surgeon: Gwyn Azul MD;  Location: [facility] MAIN OR;  Service: Urology;  Laterality: Left;    KIDNEY STONE SURGERY      URETEROSCOPY LASER LITHOTRIPSY WITH STENT INSERTION Bilateral 9/4/2021    Procedure: CYSTOSCOPY, BILATERAL URETEROSCOPY  AND  LEFT LASER LITHOTRIPSY WITH STONE REMOVAL ,  RIGHT RETROGRADE AND RIGHT URETERAL  STENT INSERTION;  Surgeon: Andre Majano MD;  Location: [facility] MAIN OR;  Service: Urology;  Laterality: Bilateral;    URETEROSCOPY LASER LITHOTRIPSY WITH STENT INSERTION Right 7/27/2023    Procedure: Cystoscopy with right ureteroscopy, stone extraction, right ureteral stent placement.;  Surgeon: Gwyn Azul MD;  Location: [facility] OR;  Service: Urology;  Laterality: Right;       Home Medications:     Current Outpatient Medications:     amLODIPine (NORVASC) 5 MG tablet, Take 1 tablet by mouth Daily. (Patient taking differently: Take 1 tablet by mouth Every Night.), Disp: 90 tablet, Rfl: 3    atorvastatin (LIPITOR) 20 MG tablet, Take 1 tablet by mouth Daily., Disp: 90 tablet, Rfl: 0    fenofibrate (TRICOR) 145 MG tablet, Take 1 tablet by mouth Daily. (Patient taking differently: Take 1 tablet by mouth Every Night.), Disp: 90 tablet, Rfl: 3    olmesartan (Benicar) 40 MG tablet, Take 1 tablet by mouth Daily for 90 days. (Patient taking differently: Take 1 tablet by mouth Every Night.), Disp: 90 tablet, Rfl: 3    HYDROcodone-acetaminophen (NORCO) 5-325 MG per tablet, Take 1 tablet by mouth Every 6 (Six) Hours As Needed for Moderate Pain., Disp: 6 tablet, Rfl: 0    HYDROcodone-acetaminophen (NORCO) 5-325 MG per tablet, Take 1 tablet by mouth Every 6 (Six) Hours As Needed for Moderate Pain (Pain)., Disp: 12 tablet, Rfl: 0    ondansetron ODT (ZOFRAN-ODT) 4 MG  "disintegrating tablet, Place 1 tablet on the tongue 4 (Four) Times a Day As Needed for Nausea or Vomiting., Disp: 9 tablet, Rfl: 0    tamsulosin (FLOMAX) 0.4 MG capsule 24 hr capsule, Take 1 capsule by mouth Daily., Disp: 10 capsule, Rfl: 0    Allergies:  Allergies   Allergen Reactions    Iodine Hives, Shortness Of Breath and Swelling       Past Social History:  Social History     Socioeconomic History    Marital status:    Tobacco Use    Smoking status: Former     Types: Cigarettes     Quit date:      Years since quittin.6    Smokeless tobacco: Never   Vaping Use    Vaping Use: Never used   Substance and Sexual Activity    Alcohol use: Not Currently     Comment: occasionally    Drug use: Never    Sexual activity: Defer       Past Family History:  Family History   Problem Relation Age of Onset    Malig Hyperthermia Neg Hx          Objective:        Vital Signs:   Visit Vitals  /72 (BP Location: Right arm, Patient Position: Sitting)   Pulse 78   Ht 175.3 cm (69.02\")   Wt 115 kg (253 lb)   SpO2 99%   BMI 37.34 kg/mý     Wt Readings from Last 3 Encounters:   23 115 kg (253 lb)   23 113 kg (249 lb 12.5 oz)   23 113 kg (249 lb 12.5 oz)          Physical Exam:   GEN:  No acute distress, alert, cooperative, well developed obese  EYES:   Sclerae clear. No icterus. PERRL. Normal EOM  ENT:   External ears/nose normal, no oral lesions, no thrush, mucous membranes moist, Septum midline. Mallampati III airway. Enlarged uvula. Redundant membranous soft palate  NECK:  Supple, midline trachea, no JVD  LUNGS: Normal chest on inspection, CTAB, no wheezes. No rhonchi. No crackles. Respirations regular, even and unlabored.   CV:  Regular rhythm and rate. Normal S1/S2. No murmurs, gallops, or rubs noted.  ABD:  Soft, nontender and nondistended. Normal bowel sounds. No guarding  EXT:  Moves all extremities well. No cyanosis. No redness. No edema.   Skin: Dry, intact, no bleeding    Diagnostic " Data:    Respiratory Disturbance Events:   This was an all-night home sleep study done on 4/20/2023.  Patient did weigh 244 pounds on the night of the study  Total recording time 396.4-minute with a monitoring time of 392.0 minutes  Respiratory summary was positive for pretty severe obstructive sleep apnea with AHI of 77.0 with a supine index of 79  Patient had significant hypoxemia with a shanell of 66% along with 152 minutes spent in the hypoxemic range below 90% that included nearly 12 minutes spent below 80%  Percentage of snoring was 61.5%      Impression:  Severe obstructive sleep apnea with AHI of 77  Severe critical sleep-related hypoxemia with desaturation in the 60s  Severe snoring     Plan:  Given the severity of the sleep apnea treatment is to be initiated ASAP  We will start auto CPAP with a minimum pressure of 8, maximum pressure of 20    Compliance download from the last 90 days showed good adherence with 99%  Average nightly use 6 hours and 14 minutes  On auto CPAP 8-20 with a median/95th percentile of 10.3/12.7 cm of water  Mild air leak with a median/95th percentile of 2.1/15.0 L/min  Good control of the sleep apnea with residual AHI of 0.4      Assessment and Plan:       ICD-10-CM ICD-9-CM   1. RIYA on CPAP  G47.33 327.23    Z99.89 V46.8   2. Sleep related hypoxia  G47.34 327.24   3. Obesity (BMI 30-39.9)  E66.9 278.00   4. Essential hypertension  I10 401.9   5. Hypertriglyceridemia  E78.1 272.1       Recommendations:     Patient is compliant with the CPAP as evident from the compliance download  Patient is getting both clinical and subjective benefit from the use of the CPAP machine  The CPAP machine is effective in controlling the underlying sleep apnea  CPAP is strongly recommended to be continued  Patient to continue work on the weight loss  No pressure adjustment recommended  Continue with a yearly follow-up or sooner as needed      No orders of the defined types were placed in this  encounter.    No orders of the defined types were placed in this encounter.    Return in about 1 year (around 8/8/2024).    Maximo Brower MD   [facility]   08/08/23  09:48 EDT    Dictated utilizing Dragon dictation

## 2023-08-24 NOTE — PROGRESS NOTES
Chief Complaint    Urologic complaint    Subjective          Paul Almendarez presents to [facility] UROLOGY  History of Present Illness    52-year-old gentleman     Nephrolithiasis  Ureteral stone        8/23  Renal US - Mild R hydro improved from CT      No gross hematuria, no pain      Stent is out      8/23 calcium oxalate monohydrate 15, calcium oxalate dihydrate 80    7/27/2023 right ureteroscopy basket stone extraction - stent with string - 4 cm prostate, all stones removed      11 stones, 6 lithotripsies    No cardiopulmonary history, non-smoker, no anticoagulation          Previous      7/21/2023 CT abdomen/pelvis without - 7 mm distal right ureteral stone.  Severe right hydro.  No stones in the right kidney.  4 mm and 2 mm stone left kidney nonobstructing  No change in 7 mm pulmonary nodule.   Images reviewed    Patient has had a lung nodule for many years, they are no longer following    7/21/2023 UA-small blood negative otherwise, no bacteria  7/21/2023 1.5, GFR 55      6/8/2022 left ureteroscopy - string left on - all stones removed  6/22 calcium oxalate monohydrate 40, calcium oxalate dihydrate 40, calcium phosphate 20        6/3/22 CT abdomen/pelvis without  -  5 x 6 x 9 mm proximal left ureter.  2 -  3 mm stones in the left kidney, punctate stones in the right kidney.  Images reviewed  6/1/2022 UA-moderate blood, negative otherwise  5/22 1.1, GFR >60     9/23/2021 right ureteroscopy with laser stent with no string -2.5 cm prostate.  1.8 cm right ureteral stone.  Removed in its entirety no other stones  10/21  Ca Oxalate - Monohydrate 40, calcium oxalate dihydrate 30, hydroxyapatite 30    9/4/2021 bilateral ureteroscopy, left stone removal, right stent placement - Gretel -no stent placed on the left, no mention of removal of nonobstructing left renal stones in the operative note.  Unable to identify the right stone because of a tortuous ureter, right stent was  placed    9/4/2021 CT-abd/pelvis without -left kidney with a 5 mm mid ureteral stone, 3 nonobstructing stones in the left kidney 5 mm, 4 mm and 2 mm.  Right kidney has a 2 mm stone in the kidney nonobstructing and a 1.4 x 1.2 proximal ureteral stone      9/21 creatinine 1.1, GFR 68     6/14/2019 CT abdomen[place] 2 left ureteral stones one is about 4 mm and one is about 7 mm.  Also a 3 mm nonobstructing left renal stone.  7 mm right nonobstructing renal stone.                  Past History:  Medical History: has a past medical history of Hyperlipidemia, Hypertension, and Kidney stone.   Surgical History: has a past surgical history that includes Kidney stone surgery; ureteroscopy laser lithotripsy with stent insertion (Bilateral, 9/4/2021); cystoscopy ureteroscopy (Right, 9/23/2021); cystoscopy ureteroscopy (Left, 6/8/2022); and ureteroscopy laser lithotripsy with stent insertion (Right, 7/27/2023).   Family History: family history is not on file.   Social History: reports that he quit smoking about 7 years ago. His smoking use included cigarettes. He has never used smokeless tobacco. He reports that he does not currently use alcohol. He reports that he does not use drugs.  Allergies: Iodine       Current Outpatient Medications:     amLODIPine (NORVASC) 5 MG tablet, Take 1 tablet by mouth Daily. (Patient taking differently: Take 1 tablet by mouth Every Night.), Disp: 90 tablet, Rfl: 3    atorvastatin (LIPITOR) 20 MG tablet, Take 1 tablet by mouth Daily., Disp: 90 tablet, Rfl: 0    fenofibrate (TRICOR) 145 MG tablet, Take 1 tablet by mouth Daily. (Patient taking differently: Take 1 tablet by mouth Every Night.), Disp: 90 tablet, Rfl: 3    HYDROcodone-acetaminophen (NORCO) 5-325 MG per tablet, Take 1 tablet by mouth Every 6 (Six) Hours As Needed for Moderate Pain., Disp: 6 tablet, Rfl: 0    HYDROcodone-acetaminophen (NORCO) 5-325 MG per tablet, Take 1 tablet by mouth Every 6 (Six) Hours  As Needed for Moderate Pain (Pain)., Disp: 12 tablet, Rfl: 0    olmesartan (Benicar) 40 MG tablet, Take 1 tablet by mouth Daily for 90 days. (Patient taking differently: Take 1 tablet by mouth Every Night.), Disp: 90 tablet, Rfl: 3    ondansetron ODT (ZOFRAN-ODT) 4 MG disintegrating tablet, Place 1 tablet on the tongue 4 (Four) Times a Day As Needed for Nausea or Vomiting., Disp: 9 tablet, Rfl: 0    tamsulosin (FLOMAX) 0.4 MG capsule 24 hr capsule, Take 1 capsule by mouth Daily., Disp: 10 capsule, Rfl: 0              Objective     Vital Signs:   There were no vitals taken for this visit.             Assessment and Plan    Diagnoses and all orders for this visit:    1. Nephrolithiasis (Primary)      The patient was counseled on the preventative measures of kidney stones today.  This included increasing fluid intake to make at least 1.5 ml daily, decreasing meat intake, decreasing salt intake and taking in a normal amount of calcium (1000 mg daily).  Information handout given on this today.      We also discussed the DASH diet today for stone prevention and handout was given      Patient interested in metabolic stone work-up.  We will get him set a 24-hour urine and have him follow-up in 10 weeks

## 2023-08-25 ENCOUNTER — HOSPITAL ENCOUNTER (OUTPATIENT)
Dept: ULTRASOUND IMAGING | Facility: HOSPITAL | Age: 53
Discharge: HOME | End: 2023-08-25
Admitting: UROLOGY
Payer: OTHER GOVERNMENT

## 2023-08-25 DIAGNOSIS — N20.1 URETERAL STONE: ICD-10-CM

## 2023-08-25 PROCEDURE — 76775 US EXAM ABDO BACK WALL LIM: CPT

## 2023-08-29 ENCOUNTER — OFFICE VISIT (OUTPATIENT)
Dept: UROLOGY | Facility: CLINIC | Age: 53
End: 2023-08-29
Payer: OTHER GOVERNMENT

## 2023-08-29 VITALS — BODY MASS INDEX: 37.89 KG/M2 | WEIGHT: 255.8 LBS | RESPIRATION RATE: 16 BRPM | HEIGHT: 69 IN

## 2023-08-29 DIAGNOSIS — N20.0 NEPHROLITHIASIS: Primary | ICD-10-CM

## 2023-09-05 ENCOUNTER — LAB (OUTPATIENT)
Dept: LAB | Facility: HOSPITAL | Age: 53
End: 2023-09-05
Payer: OTHER GOVERNMENT

## 2023-09-05 DIAGNOSIS — N28.9 KIDNEY FUNCTION ABNORMAL: ICD-10-CM

## 2023-09-05 DIAGNOSIS — E78.1 HYPERTRIGLYCERIDEMIA: ICD-10-CM

## 2023-09-05 LAB
ALBUMIN SERPL-MCNC: 4.7 G/DL (ref 3.5–5.2)
ALP SERPL-CCNC: 66 U/L (ref 39–117)
ALT SERPL W P-5'-P-CCNC: 31 U/L (ref 1–41)
ANION GAP SERPL CALCULATED.3IONS-SCNC: 10 MMOL/L (ref 5–15)
AST SERPL-CCNC: 16 U/L (ref 1–40)
BILIRUB CONJ SERPL-MCNC: <0.2 MG/DL (ref 0–0.3)
BILIRUB INDIRECT SERPL-MCNC: NORMAL MG/DL
BILIRUB SERPL-MCNC: 0.4 MG/DL (ref 0–1.2)
BUN SERPL-MCNC: 18 MG/DL (ref 6–20)
BUN/CREAT SERPL: 14.3 (ref 7–25)
CALCIUM SPEC-SCNC: 10.1 MG/DL (ref 8.6–10.5)
CHLORIDE SERPL-SCNC: 105 MMOL/L (ref 98–107)
CHOLEST SERPL-MCNC: 174 MG/DL (ref 0–200)
CK SERPL-CCNC: 48 U/L (ref 20–200)
CO2 SERPL-SCNC: 24 MMOL/L (ref 22–29)
CREAT SERPL-MCNC: 1.26 MG/DL (ref 0.76–1.27)
EGFRCR SERPLBLD CKD-EPI 2021: 68.2 ML/MIN/1.73
GLUCOSE SERPL-MCNC: 92 MG/DL (ref 65–99)
HDLC SERPL-MCNC: 40 MG/DL (ref 40–60)
LDLC SERPL CALC-MCNC: 107 MG/DL (ref 0–100)
LDLC/HDLC SERPL: 2.6 {RATIO}
POTASSIUM SERPL-SCNC: 4.4 MMOL/L (ref 3.5–5.2)
PROT SERPL-MCNC: 7.3 G/DL (ref 6–8.5)
SODIUM SERPL-SCNC: 139 MMOL/L (ref 136–145)
TRIGL SERPL-MCNC: 151 MG/DL (ref 0–150)
VLDLC SERPL-MCNC: 27 MG/DL (ref 5–40)

## 2023-09-05 PROCEDURE — 80076 HEPATIC FUNCTION PANEL: CPT

## 2023-09-05 PROCEDURE — 80048 BASIC METABOLIC PNL TOTAL CA: CPT

## 2023-09-05 PROCEDURE — 82550 ASSAY OF CK (CPK): CPT

## 2023-09-05 PROCEDURE — 80061 LIPID PANEL: CPT

## 2023-09-05 PROCEDURE — 36415 COLL VENOUS BLD VENIPUNCTURE: CPT

## 2023-10-16 RX ORDER — ATORVASTATIN CALCIUM 20 MG/1
20 TABLET, FILM COATED ORAL DAILY
Qty: 90 TABLET | Refills: 3 | Status: SHIPPED | OUTPATIENT
Start: 2023-10-16

## 2023-11-20 RX ORDER — AMLODIPINE BESYLATE 5 MG/1
5 TABLET ORAL DAILY
Qty: 90 TABLET | Refills: 3 | Status: SHIPPED | OUTPATIENT
Start: 2023-11-20

## 2023-11-29 ENCOUNTER — TELEPHONE (OUTPATIENT)
Dept: UROLOGY | Facility: CLINIC | Age: 53
End: 2023-11-29
Payer: OTHER GOVERNMENT

## 2023-11-29 NOTE — TELEPHONE ENCOUNTER
PT CALLED BACK AND STATED HE ACTUALLY NEEDED A DIFFERENT DAY. R/S TO PT PREFERENCE     Future Appointments         Provider Department Center    1/5/2024 10:00 AM Gwyn Azul MD [facility] UROLOGY [facility]    7/19/2024 9:00 AM Andrade Johnson DO [facility] FAMILY MEDICINE [facility]

## 2024-01-03 NOTE — PROGRESS NOTES
Chief Complaint    Urologic complaint    Subjective          Paul Almendarez presents to [facility] UROLOGY  History of Present Illness      53-year-old gentleman       Nephrolithiasis  Ureteral stone      Patient had some left flank pain for the last few weeks.  Passed a 3 mm stone yesterday.    No pain currently.      No gross hematuria follow-up today with 24 urine    8/23  Renal US - Mild R hydro improved from CT      No cardiopulmonary history, non-smoker, no anticoagulation    8/23 calcium oxalate monohydrate 15, calcium oxalate dihydrate 80        11 stones, 6 lithotripsies        Previous    7/27/2023 right ureteroscopy basket stone extraction - stent with string - 4 cm prostate, all stones removed    7/21/2023 CT abdomen/pelvis without - 7 mm distal right ureteral stone.  Severe right hydro.  No stones in the right kidney.  4 mm and 2 mm stone left kidney nonobstructing  No change in 7 mm pulmonary nodule.   Images reviewed    Patient has had a lung nodule for many years, they are no longer following    7/21/2023 UA-small blood negative otherwise, no bacteria  7/21/2023 1.5, GFR 55      6/8/2022 left ureteroscopy - string left on - all stones removed  6/22 calcium oxalate monohydrate 40, calcium oxalate dihydrate 40, calcium phosphate 20        6/3/22 CT abdomen/pelvis without  -  5 x 6 x 9 mm proximal left ureter.  2 -  3 mm stones in the left kidney, punctate stones in the right kidney.  Images reviewed  6/1/2022 UA-moderate blood, negative otherwise  5/22 1.1, GFR >60     9/23/2021 right ureteroscopy with laser stent with no string -2.5 cm prostate.  1.8 cm right ureteral stone.  Removed in its entirety no other stones  10/21  Ca Oxalate - Monohydrate 40, calcium oxalate dihydrate 30, hydroxyapatite 30    9/4/2021 bilateral ureteroscopy, left stone removal, right stent placement - Gretel -no stent placed on the left, no mention of removal of nonobstructing left renal stones in  the operative note.  Unable to identify the right stone because of a tortuous ureter, right stent was placed    9/4/2021 CT-abd/pelvis without -left kidney with a 5 mm mid ureteral stone, 3 nonobstructing stones in the left kidney 5 mm, 4 mm and 2 mm.  Right kidney has a 2 mm stone in the kidney nonobstructing and a 1.4 x 1.2 proximal ureteral stone      9/21 creatinine 1.1, GFR 68     6/14/2019 CT [place] 2 left ureteral stones one is about 4 mm and one is about 7 mm.  Also a 3 mm nonobstructing left renal stone.  7 mm right nonobstructing renal stone.                  Past History:  Medical History: has a past medical history of Hyperlipidemia, Hypertension, and Kidney stone.   Surgical History: has a past surgical history that includes Kidney stone surgery; ureteroscopy laser lithotripsy with stent insertion (Bilateral, 9/4/2021); cystoscopy ureteroscopy (Right, 9/23/2021); cystoscopy ureteroscopy (Left, 6/8/2022); and ureteroscopy laser lithotripsy with stent insertion (Right, 7/27/2023).   Family History: family history is not on file.   Social History: reports that he quit smoking about 8 years ago. His smoking use included cigarettes. He has never used smokeless tobacco. He reports that he does not currently use alcohol. He reports that he does not use drugs.  Allergies: Iodine       Current Outpatient Medications:     amLODIPine (NORVASC) 5 MG tablet, Take 1 tablet by mouth Daily., Disp: 90 tablet, Rfl: 3    atorvastatin (LIPITOR) 20 MG tablet, Take 1 tablet by mouth Daily., Disp: 90 tablet, Rfl: 3    fenofibrate (TRICOR) 145 MG tablet, Take 1 tablet by mouth Daily. (Patient taking differently: Take 1 tablet by mouth Every Night.), Disp: 90 tablet, Rfl: 3    HYDROcodone-acetaminophen (NORCO) 5-325 MG per tablet, Take 1 tablet by mouth Every 6 (Six) Hours As Needed for Moderate Pain., Disp: 6 tablet, Rfl: 0    HYDROcodone-acetaminophen (NORCO) 5-325 MG per tablet, Take 1  tablet by mouth Every 6 (Six) Hours As Needed for Moderate Pain (Pain)., Disp: 12 tablet, Rfl: 0    olmesartan (Benicar) 40 MG tablet, Take 1 tablet by mouth Daily for 90 days. (Patient taking differently: Take 1 tablet by mouth Every Night.), Disp: 90 tablet, Rfl: 3    ondansetron ODT (ZOFRAN-ODT) 4 MG disintegrating tablet, Place 1 tablet on the tongue 4 (Four) Times a Day As Needed for Nausea or Vomiting., Disp: 9 tablet, Rfl: 0    tamsulosin (FLOMAX) 0.4 MG capsule 24 hr capsule, Take 1 capsule by mouth Daily., Disp: 10 capsule, Rfl: 0              Objective     Vital Signs:   There were no vitals taken for this visit.             Assessment and Plan    Diagnoses and all orders for this visit:    1. Nephrolithiasis (Primary)      24-hour urine reviewed with the patient today      Low oxalate handout given    Patient will increase fluids    Continue normal calcium intake      We did discuss because he has hypercalciuria starting HCTZ 25 mg daily.  He is worried about taking more than 1 blood pressure medication.  This time we will give him a paper prescription and he will discuss with his primary care physician to see if they can implement this in his medications.      Follow-up in 6 months with 24-hour urine.

## 2024-01-05 ENCOUNTER — OFFICE VISIT (OUTPATIENT)
Dept: UROLOGY | Facility: CLINIC | Age: 54
End: 2024-01-05
Payer: OTHER GOVERNMENT

## 2024-01-05 VITALS — WEIGHT: 260 LBS | RESPIRATION RATE: 16 BRPM | HEIGHT: 69 IN | BODY MASS INDEX: 38.51 KG/M2

## 2024-01-05 DIAGNOSIS — N20.0 NEPHROLITHIASIS: Primary | ICD-10-CM

## 2024-06-08 ENCOUNTER — TELEPHONE (OUTPATIENT)
Dept: SLEEP MEDICINE | Facility: HOSPITAL | Age: 54
End: 2024-06-08
Payer: OTHER GOVERNMENT

## 2024-06-08 NOTE — TELEPHONE ENCOUNTER
Left message for patient to schedule annual [facility] visit [facility] at [phone], option 1 to schedule.

## 2024-07-03 ENCOUNTER — TELEPHONE (OUTPATIENT)
Dept: UROLOGY | Facility: CLINIC | Age: 54
End: 2024-07-03
Payer: OTHER GOVERNMENT

## 2024-07-12 ENCOUNTER — TELEPHONE (OUTPATIENT)
Dept: UROLOGY | Facility: CLINIC | Age: 54
End: 2024-07-12

## 2024-07-19 ENCOUNTER — OFFICE VISIT (OUTPATIENT)
Dept: FAMILY MEDICINE CLINIC | Facility: CLINIC | Age: 54
End: 2024-07-19
Payer: OTHER GOVERNMENT

## 2024-07-19 DIAGNOSIS — Z00.00 ANNUAL PHYSICAL EXAM: ICD-10-CM

## 2024-07-19 DIAGNOSIS — I10 ESSENTIAL HYPERTENSION: ICD-10-CM

## 2024-07-19 DIAGNOSIS — E78.1 HYPERTRIGLYCERIDEMIA: Primary | ICD-10-CM

## 2024-07-19 PROCEDURE — 99396 PREV VISIT EST AGE 40-64: CPT | Performed by: FAMILY MEDICINE

## 2024-07-22 VITALS
OXYGEN SATURATION: 95 % | HEIGHT: 69 IN | BODY MASS INDEX: 39.25 KG/M2 | DIASTOLIC BLOOD PRESSURE: 74 MMHG | WEIGHT: 265 LBS | SYSTOLIC BLOOD PRESSURE: 137 MMHG | TEMPERATURE: 97.9 F | HEART RATE: 94 BPM

## 2024-07-22 NOTE — TELEPHONE ENCOUNTER
2ND CALL - CALLED PT TO OFFER R/S OF CX'D APPT 7/12 W/ VINCENT    SPOKE W/ PT WHO DECLINED TO R/S. ANYTHING ELSE TO DO?

## 2024-07-23 PROBLEM — Z00.00 ANNUAL PHYSICAL EXAM: Status: ACTIVE | Noted: 2024-07-23

## 2024-07-23 NOTE — PROGRESS NOTES
Patient was seen during downtime due to [employer] glitch.  Please see downtime document scanned into chart.

## 2024-08-27 ENCOUNTER — OFFICE VISIT (OUTPATIENT)
Dept: SLEEP MEDICINE | Facility: HOSPITAL | Age: 54
End: 2024-08-27
Payer: OTHER GOVERNMENT

## 2024-08-27 VITALS
DIASTOLIC BLOOD PRESSURE: 73 MMHG | HEIGHT: 69 IN | SYSTOLIC BLOOD PRESSURE: 131 MMHG | OXYGEN SATURATION: 95 % | BODY MASS INDEX: 39.21 KG/M2 | HEART RATE: 112 BPM | WEIGHT: 264.7 LBS

## 2024-08-27 DIAGNOSIS — G47.33 OSA ON CPAP: Primary | ICD-10-CM

## 2024-08-27 DIAGNOSIS — E66.9 OBESITY (BMI 30-39.9): ICD-10-CM

## 2024-08-27 DIAGNOSIS — G47.34 SLEEP RELATED HYPOXIA: ICD-10-CM

## 2024-08-27 DIAGNOSIS — I10 ESSENTIAL HYPERTENSION: ICD-10-CM

## 2024-08-27 PROCEDURE — G0463 HOSPITAL OUTPT CLINIC VISIT: HCPCS

## 2024-08-27 NOTE — PROGRESS NOTES
Sleep Disorder Follow Up    Patient Name: Paul Almendarez  Age/Sex: 53 y.o. male  : 1970  MRN: [id number]    Date of Encounter Visit: 24   Referring Provider: Andrade Johnson,*  Place of Service: [facility]  Patient Care Team:  Andrade Johnson DO as PCP - General (Family Medicine)  Gwyn Azul MD as Consulting Physician (Urology)    PROBLEM LIST:  RIYA  Sleep-related oxygen  Obesity  Hypertension    History of Present Illness:  Paul Almendarez is a 53 y.o. male who is here for follow up on RIYA. Patient was last seen in the office on 2023.  Home sleep study That was done on 2023 and was positive for very severe RIYA with AHI of 77 along with critical sleep-related hypoxemia with desaturation in the 60s and auto CPAP 8-20 was started  Patient is here for the yearly follow-up  Patient uses machine every night with no complaints from the mask or the pressure.  Patient uses a nasal pillow mask, which does fit well. Patient denies any significant air leak.no dry mouth.   Patient sleeps better and has a deeper sleep with the machine and feels more energy during the day time.  Currently on auto CPAP  8-20 cm H20.  Spelter Sleepiness Scale (ESS) is 3.  Compliance download was reviewed and documented below.  Weight is up by 12 pounds since last visit (up by 25 pounds since initial sleep study).  Other comorbidities include hypertension and obesity    Review of Systems:   A ten-system review was conducted and was negative except for the following: Morning headache       Past Medical History:  Past medical, surgical, social, and family history, except as mentioned above, was unchanged from the last visit.     Past Medical History:   Diagnosis Date    Kidney stone        Past Surgical History:   Procedure Laterality Date    CYSTOSCOPY URETEROSCOPY Right 2021    Procedure: CYSTOSCOPY, RIGHT URETEROSCOPY, LASERTRIPSY, STONE BASKET EXTRACTION  AND STENT EXCHANGE;  Surgeon: Gwyn Azul MD;  Location: [facility] MAIN OR;  Service: Urology;  Laterality: Right;    CYSTOSCOPY URETEROSCOPY Left 6/8/2022    Procedure: Cystoscopy with left ureteroscopy with laser and left ureteral stent placement;  Surgeon: Gwyn Azul MD;  Location: [facility] MAIN OR;  Service: Urology;  Laterality: Left;    KIDNEY STONE SURGERY      URETEROSCOPY LASER LITHOTRIPSY WITH STENT INSERTION Bilateral 9/4/2021    Procedure: CYSTOSCOPY, BILATERAL URETEROSCOPY  AND  LEFT LASER LITHOTRIPSY WITH STONE REMOVAL ,  RIGHT RETROGRADE AND RIGHT URETERAL  STENT INSERTION;  Surgeon: Andre Majano MD;  Location: [facility] MAIN OR;  Service: Urology;  Laterality: Bilateral;    URETEROSCOPY LASER LITHOTRIPSY WITH STENT INSERTION Right 7/27/2023    Procedure: Cystoscopy with right ureteroscopy, stone extraction, right ureteral stent placement.;  Surgeon: Gwyn Azul MD;  Location: [facility] OR;  Service: Urology;  Laterality: Right;       Home Medications:     Current Outpatient Medications:     amLODIPine (NORVASC) 5 MG tablet, Take 1 tablet by mouth Daily., Disp: 90 tablet, Rfl: 3    atorvastatin (LIPITOR) 20 MG tablet, Take 1 tablet by mouth Daily., Disp: 90 tablet, Rfl: 3    fenofibrate (TRICOR) 145 MG tablet, Take 1 tablet by mouth Daily. (Patient taking differently: Take 1 tablet by mouth Every Night.), Disp: 90 tablet, Rfl: 3    olmesartan (Benicar) 40 MG tablet, Take 1 tablet by mouth Daily for 90 days. (Patient taking differently: Take 1 tablet by mouth Every Night.), Disp: 90 tablet, Rfl: 3    ondansetron ODT (ZOFRAN-ODT) 4 MG disintegrating tablet, Place 1 tablet on the tongue 4 (Four) Times a Day As Needed for Nausea or Vomiting., Disp: 9 tablet, Rfl: 0    tamsulosin (FLOMAX) 0.4 MG capsule 24 hr capsule, Take 1 capsule by mouth Daily., Disp: 10 capsule, Rfl: 0    Allergies:  Allergies   Allergen Reactions    Iodine Hives, Shortness Of Breath and Swelling       Past  "Social History:  Social History     Socioeconomic History    Marital status:    Tobacco Use    Smoking status: Former     Current packs/day: 0.00     Types: Cigarettes     Quit date: 2016     Years since quittin.6    Smokeless tobacco: Never   Vaping Use    Vaping status: Never Used   Substance and Sexual Activity    Alcohol use: Not Currently     Comment: occasionally    Drug use: Never    Sexual activity: Defer       Past Family History:  Family History   Problem Relation Age of Onset    Malig Hyperthermia Neg Hx          Objective:        Vital Signs:   Visit Vitals  /73   Pulse 112   Ht 175.3 cm (69.02\")   Wt 120 kg (264 lb 11.2 oz)   SpO2 95%   BMI 39.07 kg/m²     Wt Readings from Last 3 Encounters:   24 120 kg (264 lb 11.2 oz)   24 120 kg (265 lb)   24 118 kg (260 lb)          Physical Exam:   GEN:  No acute distress, alert, cooperative, well developed obese  EYES:   Sclerae clear. No icterus. PERRL. Normal EOM  ENT:   External ears/nose normal, no oral lesions, no thrush, mucous membranes moist, Septum midline. Mallampati III airway. Enlarged uvula. Redundant membranous soft palate  NECK:  Supple, midline trachea, no JVD  LUNGS: Normal chest on inspection, CTAB, no wheezes. No rhonchi. No crackles. Respirations regular, even and unlabored.   CV:  Regular rhythm and rate. Normal S1/S2. No murmurs, gallops, or rubs noted.  ABD:  Soft, nontender and nondistended. Normal bowel sounds. No guarding  EXT:  Moves all extremities well. No cyanosis. No redness. No edema.   Skin: Dry, intact, no bleeding    Diagnostic Data:    Respiratory Disturbance Events:   This was an all-night home sleep study done on 2023.  Patient did weigh 244 pounds on the night of the study  Total recording time 396.4-minute with a monitoring time of 392.0 minutes  Respiratory summary was positive for pretty severe obstructive sleep apnea with AHI of 77.0 with a supine index of 79  Patient had " significant hypoxemia with a shanell of 66% along with 152 minutes spent in the hypoxemic range below 90% that included nearly 12 minutes spent below 80%  Percentage of snoring was 61.5%      Impression:  Severe obstructive sleep apnea with AHI of 77  Severe critical sleep-related hypoxemia with desaturation in the 60s  Severe snoring     Plan:  Given the severity of the sleep apnea treatment is to be initiated ASAP  We will start auto CPAP with a minimum pressure of 8, maximum pressure of 20    Compliance download from 7/21/2024 - 8/19/2024 showed good adherence with 100%  Average nightly use was 6 hours and 49 minutes  Airleak is minimal with median/95th percentile of 1.6/12.0 L/min  On auto CPAP 8-20 with a median/95th percentile pressure of 10.1/12.7 cm H2O  AHI down to 0.2 reflecting excellent control       Assessment and Plan:       ICD-10-CM ICD-9-CM   1. RIYA on CPAP  G47.33 327.23   2. Sleep related hypoxia  G47.34 327.24   3. Obesity (BMI 30-39.9)  E66.9 278.00   4. Essential hypertension  I10 401.9         Recommendations:     Patient is compliant with the CPAP as evident from the compliance download  Patient is getting both clinical and subjective benefit from the use of the CPAP machine  The CPAP machine is effective in controlling the underlying sleep apnea  CPAP is strongly recommended to be continued  Patient to continue work on the weight loss  No pressure adjustment recommended  Continue with a yearly follow-up or sooner as needed      Orders Placed This Encounter   Procedures    PAP Therapy     No orders of the defined types were placed in this encounter.    Return in about 1 year (around 8/27/2025).    Maximo Brower MD   [facility]   08/27/24  13:56 EDT    Dictated utilizing Dragon dictation

## 2024-10-17 RX ORDER — ATORVASTATIN CALCIUM 20 MG/1
20 TABLET, FILM COATED ORAL DAILY
Qty: 90 TABLET | Refills: 3 | Status: SHIPPED | OUTPATIENT
Start: 2024-10-17

## 2024-11-21 RX ORDER — AMLODIPINE BESYLATE 5 MG/1
5 TABLET ORAL DAILY
Qty: 90 TABLET | Refills: 3 | Status: SHIPPED | OUTPATIENT
Start: 2024-11-21

## 2025-03-26 ENCOUNTER — TELEPHONE (OUTPATIENT)
Dept: FAMILY MEDICINE CLINIC | Facility: CLINIC | Age: 55
End: 2025-03-26
Payer: OTHER GOVERNMENT

## 2025-03-26 NOTE — TELEPHONE ENCOUNTER
Wife called and needed back dated referrals to sleep medicine for 8/2023 and 8/2024. referrals have been submitted and are pending review. Wife informed

## 2025-04-01 ENCOUNTER — TELEPHONE (OUTPATIENT)
Dept: FAMILY MEDICINE CLINIC | Facility: CLINIC | Age: 55
End: 2025-04-01
Payer: OTHER GOVERNMENT

## 2025-04-01 NOTE — TELEPHONE ENCOUNTER
Relay     Tried calling patient and also tried calling his wife to let them know that  approved the back dated referrals for 2023 and 2024 if they want to get in contact with [facility] to see if they will reprocess the claims or calling the billing service

## 2025-07-14 RX ORDER — FENOFIBRATE 145 MG/1
145 TABLET, FILM COATED ORAL DAILY
Qty: 90 TABLET | Refills: 3 | Status: SHIPPED | OUTPATIENT
Start: 2025-07-14

## 2025-07-14 RX ORDER — OLMESARTAN MEDOXOMIL 40 MG/1
40 TABLET ORAL DAILY
Qty: 90 TABLET | Refills: 3 | Status: SHIPPED | OUTPATIENT
Start: 2025-07-14 | End: 2025-10-12

## 2025-07-25 ENCOUNTER — OFFICE VISIT (OUTPATIENT)
Dept: FAMILY MEDICINE CLINIC | Facility: CLINIC | Age: 55
End: 2025-07-25
Payer: OTHER GOVERNMENT

## 2025-07-25 VITALS
WEIGHT: 271 LBS | DIASTOLIC BLOOD PRESSURE: 73 MMHG | HEIGHT: 69 IN | BODY MASS INDEX: 40.14 KG/M2 | TEMPERATURE: 98 F | SYSTOLIC BLOOD PRESSURE: 128 MMHG | HEART RATE: 87 BPM

## 2025-07-25 DIAGNOSIS — Z11.59 ENCOUNTER FOR HEPATITIS C SCREENING TEST FOR LOW RISK PATIENT: ICD-10-CM

## 2025-07-25 DIAGNOSIS — Z12.5 SCREENING FOR PROSTATE CANCER: ICD-10-CM

## 2025-07-25 DIAGNOSIS — I10 ESSENTIAL HYPERTENSION: ICD-10-CM

## 2025-07-25 DIAGNOSIS — Z00.00 ANNUAL PHYSICAL EXAM: ICD-10-CM

## 2025-07-25 DIAGNOSIS — E78.1 HYPERTRIGLYCERIDEMIA: Primary | ICD-10-CM

## 2025-07-25 LAB
ALBUMIN SERPL-MCNC: 4.4 G/DL (ref 3.5–5.2)
ALBUMIN/GLOB SERPL: 1.4 G/DL
ALP SERPL-CCNC: 59 U/L (ref 39–117)
ALT SERPL W P-5'-P-CCNC: 30 U/L (ref 1–41)
ANION GAP SERPL CALCULATED.3IONS-SCNC: 9.7 MMOL/L (ref 5–15)
AST SERPL-CCNC: 18 U/L (ref 1–40)
BILIRUB SERPL-MCNC: 0.3 MG/DL (ref 0–1.2)
BUN SERPL-MCNC: 16 MG/DL (ref 6–20)
BUN/CREAT SERPL: 11.9 (ref 7–25)
CALCIUM SPEC-SCNC: 9.7 MG/DL (ref 8.6–10.5)
CHLORIDE SERPL-SCNC: 102 MMOL/L (ref 98–107)
CHOLEST SERPL-MCNC: 172 MG/DL (ref 0–200)
CO2 SERPL-SCNC: 26.3 MMOL/L (ref 22–29)
CREAT SERPL-MCNC: 1.35 MG/DL (ref 0.76–1.27)
DEPRECATED RDW RBC AUTO: 37.3 FL (ref 37–54)
EGFRCR SERPLBLD CKD-EPI 2021: 62 ML/MIN/1.73
ERYTHROCYTE [DISTWIDTH] IN BLOOD BY AUTOMATED COUNT: 12.2 % (ref 12.3–15.4)
GLOBULIN UR ELPH-MCNC: 3.2 GM/DL
GLUCOSE SERPL-MCNC: 89 MG/DL (ref 65–99)
HCT VFR BLD AUTO: 43.8 % (ref 37.5–51)
HCV AB SER QL: NORMAL
HDLC SERPL-MCNC: 37 MG/DL (ref 40–60)
HGB BLD-MCNC: 14.4 G/DL (ref 13–17.7)
LDLC SERPL CALC-MCNC: 113 MG/DL (ref 0–100)
LDLC/HDLC SERPL: 2.98 {RATIO}
MCH RBC QN AUTO: 28 PG (ref 26.6–33)
MCHC RBC AUTO-ENTMCNC: 32.9 G/DL (ref 31.5–35.7)
MCV RBC AUTO: 85.2 FL (ref 79–97)
PLATELET # BLD AUTO: 296 10*3/MM3 (ref 140–450)
PMV BLD AUTO: 10.6 FL (ref 6–12)
POTASSIUM SERPL-SCNC: 4.6 MMOL/L (ref 3.5–5.2)
PROT SERPL-MCNC: 7.6 G/DL (ref 6–8.5)
PSA SERPL-MCNC: 0.56 NG/ML (ref 0–4)
RBC # BLD AUTO: 5.14 10*6/MM3 (ref 4.14–5.8)
SODIUM SERPL-SCNC: 138 MMOL/L (ref 136–145)
TRIGL SERPL-MCNC: 124 MG/DL (ref 0–150)
TSH SERPL DL<=0.05 MIU/L-ACNC: 1.13 UIU/ML (ref 0.27–4.2)
VLDLC SERPL-MCNC: 22 MG/DL (ref 5–40)
WBC NRBC COR # BLD AUTO: 7.74 10*3/MM3 (ref 3.4–10.8)

## 2025-07-25 PROCEDURE — 84443 ASSAY THYROID STIM HORMONE: CPT | Performed by: FAMILY MEDICINE

## 2025-07-25 PROCEDURE — 85027 COMPLETE CBC AUTOMATED: CPT | Performed by: FAMILY MEDICINE

## 2025-07-25 PROCEDURE — 86803 HEPATITIS C AB TEST: CPT | Performed by: FAMILY MEDICINE

## 2025-07-25 PROCEDURE — 80061 LIPID PANEL: CPT | Performed by: FAMILY MEDICINE

## 2025-07-25 PROCEDURE — G0103 PSA SCREENING: HCPCS | Performed by: FAMILY MEDICINE

## 2025-07-25 PROCEDURE — 80053 COMPREHEN METABOLIC PANEL: CPT | Performed by: FAMILY MEDICINE

## 2025-07-25 NOTE — PROGRESS NOTES
Chief Complaint   Patient presents with    Annual Exam        Subjective     Paul Almendarez  has a past medical history of Kidney stone.    Annual exam-overall he states she is doing well.  States she has not had any ER visits, hospitalizations, nor surgeries.    Hypertension-he does check his blood pressure at home.  It is equally as good as it is here today at 128/73.    Hyperlipidemia-he is taking his fenofibrate and atorvastatin on a daily basis.        PHQ-2 Depression Screening  Little interest or pleasure in doing things?     Feeling down, depressed, or hopeless?     PHQ-2 Total Score     PHQ-9 Depression Screening  Little interest or pleasure in doing things?     Feeling down, depressed, or hopeless?     Trouble falling or staying asleep, or sleeping too much?     Feeling tired or having little energy?     Poor appetite or overeating?     Feeling bad about yourself - or that you are a failure or have let yourself or your family down?     Trouble concentrating on things, such as reading the newspaper or watching television?     Moving or speaking so slowly that other people could have noticed? Or the opposite - being so fidgety or restless that you have been moving around a lot more than usual?     Thoughts that you would be better off dead, or of hurting yourself in some way?     PHQ-9 Total Score     If you checked off any problems, how difficult have these problems made it for you to do your work, take care of things at home, or get along with other people?       Allergies   Allergen Reactions    Iodine Hives, Shortness Of Breath and Swelling       Prior to Admission medications    Medication Sig Start Date End Date Taking? Authorizing Provider   amLODIPine (NORVASC) 5 MG tablet Take 1 tablet by mouth Daily. 11/21/24  Yes Andrade Johnson, DO   atorvastatin (LIPITOR) 20 MG tablet Take 1 tablet by mouth Daily. 10/17/24  Yes Andrade Johnson, DO   fenofibrate (TRICOR) 145 MG tablet Take  1 tablet by mouth Daily. 7/14/25  Yes Andrade Johnson DO   olmesartan (Benicar) 40 MG tablet Take 1 tablet by mouth Daily for 90 days. 7/14/25 10/12/25 Yes Andrade Johnson DO   ondansetron ODT (ZOFRAN-ODT) 4 MG disintegrating tablet Place 1 tablet on the tongue 4 (Four) Times a Day As Needed for Nausea or Vomiting. 7/21/23  Yes Sumeet Ceballos MD   tamsulosin (FLOMAX) 0.4 MG capsule 24 hr capsule Take 1 capsule by mouth Daily. 7/21/23  Yes Lizette Brewer PA-C        Patient Active Problem List   Diagnosis    Kidney stone    Hospital discharge follow-up    Essential hypertension    Nephrolithiasis    Hypertriglyceridemia    Gross hematuria    Viral upper respiratory tract infection    Snoring    Excessive caffeine intake    Ureteral stone    RIYA on CPAP    Sleep related hypoxia    Obesity (BMI 30-39.9)    Annual physical exam    Encounter for hepatitis C screening test for low risk patient    Screening for prostate cancer        Past Surgical History:   Procedure Laterality Date    CYSTOSCOPY URETEROSCOPY Right 9/23/2021    Procedure: CYSTOSCOPY, RIGHT URETEROSCOPY, LASERTRIPSY, STONE BASKET EXTRACTION AND STENT EXCHANGE;  Surgeon: Gwyn Azul MD;  Location: [facility] OR;  Service: Urology;  Laterality: Right;    CYSTOSCOPY URETEROSCOPY Left 6/8/2022    Procedure: Cystoscopy with left ureteroscopy with laser and left ureteral stent placement;  Surgeon: Gwyn Azul MD;  Location: [facility] OR;  Service: Urology;  Laterality: Left;    KIDNEY STONE SURGERY      URETEROSCOPY LASER LITHOTRIPSY WITH STENT INSERTION Bilateral 9/4/2021    Procedure: CYSTOSCOPY, BILATERAL URETEROSCOPY  AND  LEFT LASER LITHOTRIPSY WITH STONE REMOVAL ,  RIGHT RETROGRADE AND RIGHT URETERAL  STENT INSERTION;  Surgeon: Andre Majano MD;  Location: [facility] MAIN OR;  Service: Urology;  Laterality: Bilateral;    URETEROSCOPY LASER LITHOTRIPSY WITH STENT INSERTION Right 7/27/2023    Procedure:  "Cystoscopy with right ureteroscopy, stone extraction, right ureteral stent placement.;  Surgeon: Gwyn Azul MD;  Location: [facility] MAIN OR;  Service: Urology;  Laterality: Right;       Social History     Socioeconomic History    Marital status:    Tobacco Use    Smoking status: Former     Current packs/day: 0.00     Types: Cigarettes     Quit date:      Years since quittin.5    Smokeless tobacco: Never   Vaping Use    Vaping status: Never Used   Substance and Sexual Activity    Alcohol use: Not Currently     Comment: occasionally    Drug use: Never    Sexual activity: Defer       Family History   Problem Relation Age of Onset    Malig Hyperthermia Neg Hx        Family history, surgical history, past medical history, Allergies and meds reviewed with patient today and updated in [facility] EMR.     ROS:  Review of Systems   Constitutional:  Negative for fatigue.   HENT:  Negative for congestion, postnasal drip and rhinorrhea.    Eyes:  Negative for blurred vision and visual disturbance.   Respiratory:  Negative for cough, chest tightness, shortness of breath and wheezing.    Cardiovascular:  Negative for chest pain and palpitations.   Allergic/Immunologic: Negative for environmental allergies.   Neurological:  Negative for headache.   Psychiatric/Behavioral:  Negative for depressed mood. The patient is not nervous/anxious.        OBJECTIVE:  Vitals:    25 0849   BP: 128/73   BP Location: Left arm   Patient Position: Sitting   Pulse: 87   Temp: 98 °F (36.7 °C)   Weight: 123 kg (271 lb)   Height: 175.3 cm (69.02\")     No results found.   Body mass index is 40 kg/m².  No LMP for male patient.    The 10-year ASCVD risk score (Hiram AVILES, et al., 2019) is: 6.8%    Values used to calculate the score:      Age: 54 years      Sex: Male      Is Non- : No      Diabetic: No      Tobacco smoker: No      Systolic Blood Pressure: 128 mmHg      Is BP treated: Yes      HDL Cholesterol: 40 " mg/dL      Total Cholesterol: 174 mg/dL     Physical Exam  Vitals and nursing note reviewed.   Constitutional:       General: He is not in acute distress.     Appearance: Normal appearance. He is obese.   HENT:      Head: Normocephalic.      Right Ear: Tympanic membrane, ear canal and external ear normal.      Left Ear: Tympanic membrane, ear canal and external ear normal.      Nose: Nose normal.      Mouth/Throat:      Mouth: Mucous membranes are moist.      Pharynx: Oropharynx is clear.   Eyes:      General: No scleral icterus.     Conjunctiva/sclera: Conjunctivae normal.      Pupils: Pupils are equal, round, and reactive to light.   Cardiovascular:      Rate and Rhythm: Normal rate and regular rhythm.      Pulses: Normal pulses.      Heart sounds: Normal heart sounds. No murmur heard.  Pulmonary:      Effort: Pulmonary effort is normal.      Breath sounds: Normal breath sounds. No wheezing, rhonchi or rales.   Musculoskeletal:      Cervical back: Neck supple. No rigidity or tenderness.   Lymphadenopathy:      Cervical: No cervical adenopathy.   Skin:     General: Skin is warm and dry.      Coloration: Skin is not jaundiced.      Findings: No rash.   Neurological:      General: No focal deficit present.      Mental Status: He is alert and oriented to person, place, and time.   Psychiatric:         Mood and Affect: Mood normal.         Thought Content: Thought content normal.         Judgment: Judgment normal.         Procedures    No visits with results within 30 Day(s) from this visit.   Latest known visit with results is:   Lab on 09/05/2023   Component Date Value Ref Range Status    Glucose 09/05/2023 92  65 - 99 mg/dL Final    BUN 09/05/2023 18  6 - 20 mg/dL Final    Creatinine 09/05/2023 1.26  0.76 - 1.27 mg/dL Final    Sodium 09/05/2023 139  136 - 145 mmol/L Final    Potassium 09/05/2023 4.4  3.5 - 5.2 mmol/L Final    Chloride 09/05/2023 105  98 - 107 mmol/L Final    CO2 09/05/2023 24.0  22.0 - 29.0 mmol/L  Final    Calcium 09/05/2023 10.1  8.6 - 10.5 mg/dL Final    BUN/Creatinine Ratio 09/05/2023 14.3  7.0 - 25.0 Final    Anion Gap 09/05/2023 10.0  5.0 - 15.0 mmol/L Final    eGFR 09/05/2023 68.2  >60.0 mL/min/1.73 Final    Total Protein 09/05/2023 7.3  6.0 - 8.5 g/dL Final    Albumin 09/05/2023 4.7  3.5 - 5.2 g/dL Final    ALT (SGPT) 09/05/2023 31  1 - 41 U/L Final    AST (SGOT) 09/05/2023 16  1 - 40 U/L Final    Alkaline Phosphatase 09/05/2023 66  39 - 117 U/L Final    Total Bilirubin 09/05/2023 0.4  0.0 - 1.2 mg/dL Final    Bilirubin, Direct 09/05/2023 <0.2  0.0 - 0.3 mg/dL Final    Bilirubin, Indirect 09/05/2023    Final    Unable to calculate    Creatine Kinase 09/05/2023 48  20 - 200 U/L Final    Total Cholesterol 09/05/2023 174  0 - 200 mg/dL Final    Triglycerides 09/05/2023 151 (H)  0 - 150 mg/dL Final    HDL Cholesterol 09/05/2023 40  40 - 60 mg/dL Final    LDL Cholesterol  09/05/2023 107 (H)  0 - 100 mg/dL Final    VLDL Cholesterol 09/05/2023 27  5 - 40 mg/dL Final    LDL/HDL Ratio 09/05/2023 2.60   Final       ASSESSMENT/ PLAN:    Diagnoses and all orders for this visit:    1. Hypertriglyceridemia (Primary)  Assessment & Plan:   Will get an update on his lipid profile with his routine labs.    Orders:  -     Comprehensive Metabolic Panel  -     TSH Rfx On Abnormal To Free T4  -     Lipid Panel    2. Essential hypertension  Assessment & Plan:  His blood pressure is great here as well as at home.  Will continue his current meds and update his labs    Orders:  -     CBC (No Diff)  -     Comprehensive Metabolic Panel  -     Lipid Panel    3. Annual physical exam  Assessment & Plan:  Over all he is doing well we will get an update on his annual labs.  He is also due for a Tdap and a pneumococcal vaccine.  He will also work on lifestyle with diet exercise and weight loss.    Orders:  -     CBC (No Diff)  -     Comprehensive Metabolic Panel  -     PSA Screen  -     TSH Rfx On Abnormal To Free T4  -     Lipid  Panel  -     Hepatitis C Antibody    4. Encounter for hepatitis C screening test for low risk patient  -     Hepatitis C Antibody    5. Screening for prostate cancer  -     PSA Screen    Other orders  -     Tdap Vaccine => 8yo IM (BOOSTRIX/ADACEL)  -     Pneumococcal Conjugate Vaccine 21 (18+ yrs)        Class 3 Severe Obesity (BMI >=40). Obesity-related health conditions include the following: hypertension and dyslipidemias. Obesity is unchanged. BMI is is above average; BMI management plan is completed. We discussed low calorie, low carb based diet program, portion control, and increasing exercise.      Orders Placed Today:     No orders of the defined types were placed in this encounter.       Management Plan:     An After Visit Summary was printed and given to the patient at discharge.    Follow-up: Return in about 6 months (around 1/25/2026) for Recheck.    Andrade Johnson DO 7/25/2025 09:06 EDT  This note was electronically signed.

## 2025-07-25 NOTE — ASSESSMENT & PLAN NOTE
Over all he is doing well we will get an update on his annual labs.  He is also due for a Tdap and a pneumococcal vaccine.  He will also work on lifestyle with diet exercise and weight loss.

## 2025-07-31 ENCOUNTER — OFFICE VISIT (OUTPATIENT)
Dept: FAMILY MEDICINE CLINIC | Facility: CLINIC | Age: 55
End: 2025-07-31
Payer: OTHER GOVERNMENT

## 2025-07-31 VITALS
HEART RATE: 106 BPM | SYSTOLIC BLOOD PRESSURE: 125 MMHG | HEIGHT: 69 IN | WEIGHT: 270 LBS | DIASTOLIC BLOOD PRESSURE: 85 MMHG | OXYGEN SATURATION: 99 % | BODY MASS INDEX: 39.99 KG/M2

## 2025-07-31 DIAGNOSIS — K62.5 RECTAL BLEEDING: Primary | ICD-10-CM

## 2025-07-31 PROCEDURE — 99213 OFFICE O/P EST LOW 20 MIN: CPT | Performed by: NURSE PRACTITIONER

## 2025-07-31 NOTE — PROGRESS NOTES
Paul Almendarez presents to [facility] with complaint of  Rectal Bleeding (Diarrhea and bloody stools twice couple days )    SUBJECTIVE  Rectal Bleeding  Symptoms: no abdominal pain, no anorexia, no joint pain, no change in stool, no chest pain, no chills, no congestion, no cough, no diaphoresis, no fatigue, no fever, no headaches, no joint swelling, no myalgias, no nausea, no neck pain, no numbness, no rash, no sore throat, no swollen glands, no dysuria, no vertigo, no visual change, no vomiting and no weakness      Blood in stool water on morning of 7/26/2025 and 7/30/2025.  Symptoms are: new.   Onset was in the past 7 days.   Symptoms occur: intermittently.  Pertinent negative symptoms include no abdominal pain, no anorexia, no joint pain, no change in stool, no chest pain, no chills, no congestion, no cough, no diaphoresis, no fatigue, no fever, no headaches, no joint swelling, no myalgias, no nausea, no neck pain, no numbness, no rash, no sore throat, no swollen glands, no dysuria, no vertigo, no visual change, no vomiting and no weakness.   Treatment and/or Medications comments include: None       History of Present Illness  The patient is a 54-year-old male who presents for evaluation of rectal bleeding.    He experienced an episode of loose, rapid bowel movement on Saturday morning, following a meal of spicy food on Friday night. He noticed a pinkish hue in his stool after using a bidet. Later that evening, he observed a red streak on the toilet paper after defecation. He suspected hemorrhoids, a condition he has had in the past. On Sunday, he did not notice any alarming signs during his bowel movement and felt well. On Monday morning, he had a regular bowel movement without any unusual findings. However, after consuming spicy food for lunch, he noticed a pinkish hue in his stool again on Tuesday morning. He reports no pain but admits to mild itching. He has not used  "any creams or Preparation H since the onset of these symptoms. He recalls previous episodes of hemorrhoidal irritation characterized by severe itching and burning, but this is not the case currently. He reports no constipation or straining during bowel movements, except for one instance on Saturday morning when he felt as if he was emptying out a lot. He has found Preparation H effective in the past and is considering its use again. He has never undergone a colonoscopy. His father had colon cancer.          OBJECTIVE  Vital Signs:   /85   Pulse 106   Ht 175.3 cm (69.02\")   Wt 122 kg (270 lb)   SpO2 99%   BMI 39.85 kg/m²       Physical Exam  HENT:      Head: Normocephalic and atraumatic.   Cardiovascular:      Rate and Rhythm: Normal rate and regular rhythm.      Pulses: Normal pulses.      Heart sounds: Normal heart sounds.   Pulmonary:      Effort: Pulmonary effort is normal.      Breath sounds: Normal breath sounds.   Musculoskeletal:      Cervical back: Neck supple.   Skin:     General: Skin is warm and dry.   Neurological:      General: No focal deficit present.      Mental Status: He is alert and oriented to person, place, and time. Mental status is at baseline.   Psychiatric:         Mood and Affect: Mood normal.         Behavior: Behavior normal.              ASSESSMENT AND PLAN:  Diagnoses and all orders for this visit:    1. Rectal bleeding (Primary)  -     Ambulatory Referral to General Surgery        Assessment & Plan  1. Rectal bleeding.  - Symptoms include intermittent rectal bleeding, with episodes of pink and red streaks observed, and occasional itching.  - Discussed the potential causes, including hemorrhoids and fissures, and the necessity of a colonoscopy to rule out colon cancer, especially given the family history of colon cancer.  - Referral for a colonoscopy has been made. Advised to use Preparation H cream and warm baths with Epsom salt for comfort. Instructed to avoid straining " during bowel movements and to seek immediate medical attention if experiencing profuse bleeding, rectal pain, abdominal pain, or nausea.        Follow Up   No follow-ups on file. Patient to notify office with any acute concerns or issues.  Patient verbalizes understanding, agrees with plan of care and has no further questions upon discharge.     Patient was given instructions and counseling regarding his condition or for health maintenance advice. Please see specific information pulled into the AVS if appropriate.     Discussed the importance of following up with any needed screening tests/labs/specialist appointments and any requested follow-up recommended by me today. Importance of maintaining follow-up discussed and patient accepts that missed appointments can delay diagnosis and potentially lead to worsening of conditions.    Patient or patient representative verbalized consent for the use of Ambient Listening during the visit with  SHIVA Nathan for chart documentation. 7/31/2025  13:44 EDT

## (undated) DEVICE — ENDOSCOPIC VALVE WITH ADAPTER.: Brand: SURSEAL® II

## (undated) DEVICE — TRY PREP SCRB VAG PVP

## (undated) DEVICE — BASIC SINGLE BASIN-LF: Brand: MEDLINE INDUSTRIES, INC.

## (undated) DEVICE — NITINOL STONE RETRIEVAL BASKET: Brand: ESCAPE

## (undated) DEVICE — FIBR LASR HOLMIUM COMPAT 272MH DISP

## (undated) DEVICE — GW PTFE/COAT STR/TP STFF/BDY .038 150CM STRL

## (undated) DEVICE — GLV SURG SENSICARE SLT PF LF 8 STRL

## (undated) DEVICE — CYSTO PACK: Brand: MEDLINE INDUSTRIES, INC.

## (undated) DEVICE — GLV SURG SENSICARE SLT PF LF 6.5 STRL

## (undated) DEVICE — Device

## (undated) DEVICE — 3M™ STERI-DRAPE™ X-RAY IMAGE INTENSIFIER DRAPE, 10 PER CARTON / 4 CARTONS PER CASE, 1013: Brand: STERI-DRAPE™

## (undated) DEVICE — Y-TYPE TUR/BLADDER IRRIGATION SET, REGULATING CLAMP

## (undated) DEVICE — GW ULTRATRACK HYBRID STR/TP .035IN 3X150CM

## (undated) DEVICE — SHEATH URETRL ACC UROPASS 12/14F 38CM

## (undated) DEVICE — GW SUREGLIDE FLXTIP ANG/TP .038 3X150CM EA/5

## (undated) DEVICE — SHEATH ACC URETRL UROPASS 12/14F 24CM EA/5

## (undated) DEVICE — FIBR LASR HOLMIUM EMPOWER 365MH DISP

## (undated) DEVICE — RADIFOCUS GLIDEWIRE: Brand: GLIDEWIRE

## (undated) DEVICE — TOWEL,OR,DSP,ST,BLUE,STD,4/PK,20PK/CS: Brand: MEDLINE

## (undated) DEVICE — GOWN,REINFORCE,POLY,SIRUS,BREATH SLV,XLG: Brand: MEDLINE

## (undated) DEVICE — SOL IRR NACL 0.9PCT 3000ML

## (undated) DEVICE — SKIN PREP TRAY W/CHG: Brand: MEDLINE INDUSTRIES, INC.

## (undated) DEVICE — GW PTFE/COAT STR/TP STFF/BDY .038 150CM STRL EA/5

## (undated) DEVICE — SYS IRR PUMP SGL ACTN VAC SYR 10CC

## (undated) DEVICE — CATH URETRL FLXITP POLLACK STD 5F 70CM

## (undated) DEVICE — DUAL LUMEN URETERAL CATHETER

## (undated) DEVICE — GW SUREGLIDE FLXTIP ANG/TP .038 3X150CM